# Patient Record
Sex: FEMALE | Race: WHITE | NOT HISPANIC OR LATINO | Employment: PART TIME | URBAN - METROPOLITAN AREA
[De-identification: names, ages, dates, MRNs, and addresses within clinical notes are randomized per-mention and may not be internally consistent; named-entity substitution may affect disease eponyms.]

---

## 2017-08-14 ENCOUNTER — ALLSCRIPTS OFFICE VISIT (OUTPATIENT)
Dept: OTHER | Facility: OTHER | Age: 22
End: 2017-08-14

## 2017-08-14 LAB — HCG, QUALITATIVE (HISTORICAL): NEGATIVE

## 2017-09-14 ENCOUNTER — GENERIC CONVERSION - ENCOUNTER (OUTPATIENT)
Dept: OTHER | Facility: OTHER | Age: 22
End: 2017-09-14

## 2018-01-13 VITALS
DIASTOLIC BLOOD PRESSURE: 56 MMHG | HEART RATE: 80 BPM | SYSTOLIC BLOOD PRESSURE: 104 MMHG | HEIGHT: 63 IN | RESPIRATION RATE: 12 BRPM | BODY MASS INDEX: 25.69 KG/M2 | TEMPERATURE: 97.2 F | WEIGHT: 145 LBS

## 2018-01-14 NOTE — PROGRESS NOTES
Assessment    1  BMI 26 0-26 9,adult (V85 22) (Z68 26)   2  Encounter for preventive health examination (V70 0) (Z00 00)   3  Birth control counseling (V25 09) (Z30 09)   4  Need for diphtheria-tetanus-pertussis (Tdap) vaccine (V06 1) (Z23)    Plan  Birth control counseling    · Sprintec 28 0 25-35 MG-MCG Oral Tablet; TAKE 1 TABLET DAILY AS DIRECTED   · Urine HCG- POC; Status:Complete;   Done: [id number] 01:48PM  Need for diphtheria-tetanus-pertussis (Tdap) vaccine    · Tdap (Adacel)    Discussion/Summary  health maintenance visit Currently, she eats a healthy diet and has an adequate exercise regimen  the risks and benefits of cervical cancer screening were discussed The risks and benefits of immunizations were discussed  Advice and education were given regarding nutrition, aerobic exercise, reproductive health and contraception  Pt will schedule pap  will start on ocp  preg in office     recommend Sunday start date for OCP  continue eat healthy and exercise  The patient was counseled regarding instructions for management, risk factor reductions, patient and family education, impressions, risks and benefits of treatment options, importance of compliance with treatment  Possible side effects of new medications were reviewed with the patient/guardian today  The treatment plan was reviewed with the patient/guardian  The patient/guardian understands and agrees with the treatment plan      Chief Complaint  Patient states that her last PCP was pediatrician  Patient adds that she is considering starting birth control  bh/lpn      History of Present Illness  HM, Adult Female: The patient is being seen for a health maintenance evaluation  Social History: Household members include mother and father  Work status:   The patient has never smoked cigarettes  She reports occasional alcohol use  General Health: The patient's health since the last visit is described as good  She has regular dental visits   She denies vision problems  She denies hearing loss  Immunizations status: not up to date  Lifestyle:  She consumes a diverse and healthy diet  She does not have any weight concerns  She exercises regularly  She does not use tobacco  She consumes alcohol  She denies drug use  Reproductive health:  she is sexually active  started to be sexually active recently---will schedule pap  wants to start the pill  has 3year old daughter  Screening: cancer screening reviewed and updated  metabolic screening reviewed and updated  risk screening reviewed and updated  HPI: Pt seen and examined  Here as a new patient  UTD with all vaccines except due for adacel  Review of Systems    Constitutional: not feeling poorly, no recent weight gain, not feeling tired and no recent weight loss  Eyes: no eyesight problems  ENT: no hearing loss  Cardiovascular: the heart rate was not slow, no chest pain, the heart rate was not fast and no palpitations  Respiratory: no shortness of breath, no cough and no shortness of breath during exertion  Gastrointestinal: no change in bowel or bladder habits  Genitourinary: no change in bowel or bladder habits  Musculoskeletal: no arthralgias and no myalgias  Integumentary: no rashes  Neurological: no headache and no dizziness  Psychiatric: no anxiety and no depression  Surgical History    · History of Ear Pressure Equalization Tube, Insertion, Bilaterally    Social History    · Caffeine use (V49 89) (F15 90)   · Consumes alcohol occasionally (V49 89) (Z78 9)   · Does not use illicit drugs (W76 13) (Z78 9)   · Never     Current Meds   1  No Reported Medications Recorded    Allergies    1   No Known Drug Allergies    Vitals   Recorded: [id number] 01:02PM   Temperature 97 2 F   Heart Rate 80   Respiration 12   Systolic 051   Diastolic 56   Height 5 ft 2 5 in   Weight 145 lb    BMI Calculated 26 1   BSA Calculated 1 68     Physical Exam    Constitutional   General appearance: No acute distress, well appearing and well nourished  Head and Face   Head and face: Normal     Eyes   Conjunctiva and lids: No swelling, erythema or discharge  Pupils and irises: Equal, round, reactive to light  Ears, Nose, Mouth, and Throat   External inspection of ears and nose: Normal     Otoscopic examination: Tympanic membranes translucent with normal light reflex  Canals patent without erythema  Lips, teeth, and gums: Normal, good dentition  Oropharynx: Normal with no erythema, edema, exudate or lesions  Neck   Neck: Supple, symmetric, trachea midline, no masses  Thyroid: Normal, no thyromegaly  Pulmonary   Respiratory effort: No increased work of breathing or signs of respiratory distress  Auscultation of lungs: Clear to auscultation  Cardiovascular   Auscultation of heart: Normal rate and rhythm, normal S1 and S2, no murmurs  Peripheral vascular exam: Normal     Examination of extremities for edema and/or varicosities: Normal     Abdomen   Abdomen: Non-tender, no masses  Liver and spleen: No hepatomegaly or splenomegaly  Lymphatic   Palpation of lymph nodes in neck: No lymphadenopathy  Musculoskeletal   Gait and station: Normal     Digits and nails: Normal without clubbing or cyanosis  Joints, bones, and muscles: Normal     Muscle strength/tone: Normal     Neurologic   Cranial nerves: Cranial nerves II-XII intact  Cortical function: Normal mental status  Reflexes: 2+ and symmetric      Psychiatric   Judgment and insight: Normal     Mood and affect: Normal        Results/Data  Urine HCG- POC [id number] 01:48PM Akosua Vargas     Test Name Result Flag Reference   Urine HCG Negative         Future Appointments    Date/Time Provider Specialty Site   09/05/2017 01:00 PM Akosua Vargas DO Family Medicine [facility]     Signatures   Electronically signed by : Ally Duke DO; Aug 15 2017  9:09AM EST (Author)

## 2018-01-22 VITALS
RESPIRATION RATE: 16 BRPM | HEART RATE: 74 BPM | BODY MASS INDEX: 25.94 KG/M2 | TEMPERATURE: 97.6 F | SYSTOLIC BLOOD PRESSURE: 110 MMHG | WEIGHT: 146.4 LBS | DIASTOLIC BLOOD PRESSURE: 74 MMHG | HEIGHT: 63 IN

## 2018-02-22 ENCOUNTER — HOSPITAL ENCOUNTER (INPATIENT)
Facility: HOSPITAL | Age: 23
LOS: 1 days | Discharge: HOME | DRG: 872 | End: 2018-02-23
Attending: EMERGENCY MEDICINE | Admitting: STUDENT IN AN ORGANIZED HEALTH CARE EDUCATION/TRAINING PROGRAM
Payer: COMMERCIAL

## 2018-02-22 ENCOUNTER — APPOINTMENT (EMERGENCY)
Dept: RADIOLOGY | Facility: HOSPITAL | Age: 23
DRG: 872 | End: 2018-02-22
Payer: COMMERCIAL

## 2018-02-22 DIAGNOSIS — J11.1 INFLUENZA: ICD-10-CM

## 2018-02-22 DIAGNOSIS — N39.0 UTI (URINARY TRACT INFECTION): ICD-10-CM

## 2018-02-22 DIAGNOSIS — R50.9 FEBRILE ILLNESS: Primary | ICD-10-CM

## 2018-02-22 PROBLEM — D72.829 LEUKOCYTOSIS: Status: ACTIVE | Noted: 2018-02-22

## 2018-02-22 PROBLEM — J06.9 URI WITH COUGH AND CONGESTION: Status: ACTIVE | Noted: 2018-02-22

## 2018-02-22 PROBLEM — E87.6 HYPOKALEMIA: Status: ACTIVE | Noted: 2018-02-22

## 2018-02-22 PROBLEM — A41.9 SEPSIS (HCC): Status: ACTIVE | Noted: 2018-02-22

## 2018-02-22 LAB
ALBUMIN SERPL BCP-MCNC: 3.6 G/DL (ref 3.5–5)
ALP SERPL-CCNC: 63 U/L (ref 46–116)
ALT SERPL W P-5'-P-CCNC: 19 U/L (ref 12–78)
ANION GAP SERPL CALCULATED.3IONS-SCNC: 14 MMOL/L (ref 4–13)
AST SERPL W P-5'-P-CCNC: 12 U/L (ref 5–45)
BACTERIA UR QL AUTO: ABNORMAL /HPF
BILIRUB DIRECT SERPL-MCNC: 0.2 MG/DL (ref 0–0.2)
BILIRUB SERPL-MCNC: 0.7 MG/DL (ref 0.2–1)
BILIRUB UR QL STRIP: ABNORMAL
BUN SERPL-MCNC: 11 MG/DL (ref 5–25)
CALCIUM SERPL-MCNC: 8.7 MG/DL (ref 8.3–10.1)
CHLORIDE SERPL-SCNC: 98 MMOL/L (ref 100–108)
CLARITY UR: ABNORMAL
CO2 SERPL-SCNC: 26 MMOL/L (ref 21–32)
COLOR UR: YELLOW
CREAT SERPL-MCNC: 0.77 MG/DL (ref 0.6–1.3)
ERYTHROCYTE [DISTWIDTH] IN BLOOD BY AUTOMATED COUNT: 13.3 % (ref 11.6–15.1)
GFR SERPL CREATININE-BSD FRML MDRD: 110 ML/MIN/1.73SQ M
GLUCOSE SERPL-MCNC: 128 MG/DL (ref 65–140)
GLUCOSE UR STRIP-MCNC: NEGATIVE MG/DL
HCT VFR BLD AUTO: 42.9 % (ref 37–47)
HGB BLD-MCNC: 14.4 G/DL (ref 12–16)
HGB UR QL STRIP.AUTO: ABNORMAL
KETONES UR STRIP-MCNC: ABNORMAL MG/DL
LACTATE SERPL-SCNC: 0.9 MMOL/L (ref 0.5–2)
LEUKOCYTE ESTERASE UR QL STRIP: NEGATIVE
MAGNESIUM SERPL-MCNC: 1.5 MG/DL (ref 1.6–2.6)
MCH RBC QN AUTO: 27.3 PG (ref 27–31)
MCHC RBC AUTO-ENTMCNC: 33.6 G/DL (ref 31.4–37.4)
MCV RBC AUTO: 81 FL (ref 82–98)
MONOCYTES # BLD AUTO: 0.43 THOUSAND/UL (ref 0–1.22)
MONOCYTES NFR BLD AUTO: 2 % (ref 4–12)
NEUTS BAND NFR BLD MANUAL: 25 % (ref 0–8)
NEUTS SEG # BLD: 20.97 THOUSAND/UL (ref 1.81–6.82)
NEUTS SEG NFR BLD AUTO: 73 %
NITRITE UR QL STRIP: NEGATIVE
NON-SQ EPI CELLS URNS QL MICRO: ABNORMAL /HPF
NRBC BLD AUTO-RTO: 0 /100 WBCS
PH UR STRIP.AUTO: 6 [PH] (ref 5–9)
PLATELET # BLD AUTO: 235 THOUSANDS/UL (ref 130–400)
PLATELET BLD QL SMEAR: ADEQUATE
PMV BLD AUTO: 9.7 FL (ref 8.9–12.7)
POTASSIUM SERPL-SCNC: 3 MMOL/L (ref 3.5–5.3)
PROT SERPL-MCNC: 8.4 G/DL (ref 6.4–8.2)
PROT UR STRIP-MCNC: ABNORMAL MG/DL
RBC # BLD AUTO: 5.28 MILLION/UL (ref 4.2–5.4)
RBC #/AREA URNS AUTO: ABNORMAL /HPF
SODIUM SERPL-SCNC: 138 MMOL/L (ref 136–145)
SP GR UR STRIP.AUTO: >=1.03 (ref 1–1.03)
TOTAL CELLS COUNTED SPEC: 100
UROBILINOGEN UR QL STRIP.AUTO: 0.2 E.U./DL
WBC # BLD AUTO: 21.4 THOUSAND/UL (ref 4.8–10.8)
WBC #/AREA URNS AUTO: ABNORMAL /HPF

## 2018-02-22 PROCEDURE — 87798 DETECT AGENT NOS DNA AMP: CPT | Performed by: EMERGENCY MEDICINE

## 2018-02-22 PROCEDURE — 80076 HEPATIC FUNCTION PANEL: CPT | Performed by: STUDENT IN AN ORGANIZED HEALTH CARE EDUCATION/TRAINING PROGRAM

## 2018-02-22 PROCEDURE — 96361 HYDRATE IV INFUSION ADD-ON: CPT

## 2018-02-22 PROCEDURE — 87081 CULTURE SCREEN ONLY: CPT | Performed by: STUDENT IN AN ORGANIZED HEALTH CARE EDUCATION/TRAINING PROGRAM

## 2018-02-22 PROCEDURE — 96374 THER/PROPH/DIAG INJ IV PUSH: CPT

## 2018-02-22 PROCEDURE — 81001 URINALYSIS AUTO W/SCOPE: CPT | Performed by: EMERGENCY MEDICINE

## 2018-02-22 PROCEDURE — 80048 BASIC METABOLIC PNL TOTAL CA: CPT | Performed by: EMERGENCY MEDICINE

## 2018-02-22 PROCEDURE — 85007 BL SMEAR W/DIFF WBC COUNT: CPT | Performed by: EMERGENCY MEDICINE

## 2018-02-22 PROCEDURE — 83735 ASSAY OF MAGNESIUM: CPT | Performed by: STUDENT IN AN ORGANIZED HEALTH CARE EDUCATION/TRAINING PROGRAM

## 2018-02-22 PROCEDURE — 87086 URINE CULTURE/COLONY COUNT: CPT | Performed by: EMERGENCY MEDICINE

## 2018-02-22 PROCEDURE — 87040 BLOOD CULTURE FOR BACTERIA: CPT | Performed by: EMERGENCY MEDICINE

## 2018-02-22 PROCEDURE — 96375 TX/PRO/DX INJ NEW DRUG ADDON: CPT

## 2018-02-22 PROCEDURE — 99222 1ST HOSP IP/OBS MODERATE 55: CPT | Performed by: STUDENT IN AN ORGANIZED HEALTH CARE EDUCATION/TRAINING PROGRAM

## 2018-02-22 PROCEDURE — 83605 ASSAY OF LACTIC ACID: CPT | Performed by: EMERGENCY MEDICINE

## 2018-02-22 PROCEDURE — 71046 X-RAY EXAM CHEST 2 VIEWS: CPT

## 2018-02-22 PROCEDURE — 99284 EMERGENCY DEPT VISIT MOD MDM: CPT

## 2018-02-22 PROCEDURE — 36415 COLL VENOUS BLD VENIPUNCTURE: CPT | Performed by: EMERGENCY MEDICINE

## 2018-02-22 PROCEDURE — 85027 COMPLETE CBC AUTOMATED: CPT | Performed by: EMERGENCY MEDICINE

## 2018-02-22 RX ORDER — POTASSIUM CHLORIDE 20 MEQ/1
40 TABLET, EXTENDED RELEASE ORAL ONCE
Status: COMPLETED | OUTPATIENT
Start: 2018-02-22 | End: 2018-02-22

## 2018-02-22 RX ORDER — CIPROFLOXACIN 2 MG/ML
400 INJECTION, SOLUTION INTRAVENOUS EVERY 12 HOURS
Status: DISCONTINUED | OUTPATIENT
Start: 2018-02-22 | End: 2018-02-23 | Stop reason: HOSPADM

## 2018-02-22 RX ORDER — SODIUM CHLORIDE 9 MG/ML
75 INJECTION, SOLUTION INTRAVENOUS CONTINUOUS
Status: DISCONTINUED | OUTPATIENT
Start: 2018-02-22 | End: 2018-02-23 | Stop reason: HOSPADM

## 2018-02-22 RX ORDER — ACETAMINOPHEN 325 MG/1
650 TABLET ORAL ONCE
Status: COMPLETED | OUTPATIENT
Start: 2018-02-22 | End: 2018-02-22

## 2018-02-22 RX ORDER — OSELTAMIVIR PHOSPHATE 75 MG/1
75 CAPSULE ORAL ONCE
Status: COMPLETED | OUTPATIENT
Start: 2018-02-22 | End: 2018-02-22

## 2018-02-22 RX ORDER — MAGNESIUM SULFATE HEPTAHYDRATE 40 MG/ML
2 INJECTION, SOLUTION INTRAVENOUS ONCE
Status: COMPLETED | OUTPATIENT
Start: 2018-02-22 | End: 2018-02-22

## 2018-02-22 RX ORDER — ONDANSETRON 2 MG/ML
4 INJECTION INTRAMUSCULAR; INTRAVENOUS ONCE
Status: COMPLETED | OUTPATIENT
Start: 2018-02-22 | End: 2018-02-22

## 2018-02-22 RX ORDER — HEPARIN SODIUM 5000 [USP'U]/ML
5000 INJECTION, SOLUTION INTRAVENOUS; SUBCUTANEOUS EVERY 8 HOURS SCHEDULED
Status: DISCONTINUED | OUTPATIENT
Start: 2018-02-22 | End: 2018-02-23 | Stop reason: HOSPADM

## 2018-02-22 RX ORDER — KETOROLAC TROMETHAMINE 30 MG/ML
30 INJECTION, SOLUTION INTRAMUSCULAR; INTRAVENOUS ONCE
Status: COMPLETED | OUTPATIENT
Start: 2018-02-22 | End: 2018-02-22

## 2018-02-22 RX ADMIN — POTASSIUM CHLORIDE 40 MEQ: 1500 TABLET, EXTENDED RELEASE ORAL at 18:04

## 2018-02-22 RX ADMIN — ACETAMINOPHEN 650 MG: 325 TABLET, FILM COATED ORAL at 16:08

## 2018-02-22 RX ADMIN — SODIUM CHLORIDE 1000 ML: 0.9 INJECTION, SOLUTION INTRAVENOUS at 17:29

## 2018-02-22 RX ADMIN — SODIUM CHLORIDE 1000 ML: 0.9 INJECTION, SOLUTION INTRAVENOUS at 19:09

## 2018-02-22 RX ADMIN — KETOROLAC TROMETHAMINE 30 MG: 30 INJECTION, SOLUTION INTRAMUSCULAR at 17:28

## 2018-02-22 RX ADMIN — HEPARIN SODIUM 5000 UNITS: 5000 INJECTION, SOLUTION INTRAVENOUS; SUBCUTANEOUS at 22:30

## 2018-02-22 RX ADMIN — POTASSIUM CHLORIDE 20 MEQ: 2 INJECTION, SOLUTION, CONCENTRATE INTRAVENOUS at 23:36

## 2018-02-22 RX ADMIN — PIPERACILLIN SODIUM,TAZOBACTAM SODIUM 3.38 G: 3; .375 INJECTION, POWDER, FOR SOLUTION INTRAVENOUS at 19:09

## 2018-02-22 RX ADMIN — OSELTAMIVIR PHOSPHATE 75 MG: 75 CAPSULE ORAL at 19:09

## 2018-02-22 RX ADMIN — MAGNESIUM SULFATE HEPTAHYDRATE 2 G: 40 INJECTION, SOLUTION INTRAVENOUS at 22:21

## 2018-02-22 RX ADMIN — SODIUM CHLORIDE 75 ML/HR: 0.9 INJECTION, SOLUTION INTRAVENOUS at 22:21

## 2018-02-22 RX ADMIN — ONDANSETRON 4 MG: 2 INJECTION INTRAMUSCULAR; INTRAVENOUS at 17:27

## 2018-02-22 NOTE — ED PROVIDER NOTES
History  Chief Complaint   Patient presents with    Flu Symptoms     pt c/o flu like symptoms x 1 week, states vomiting since last night  25 year female with complaints of 103 5 fever with nausea and vomiting body aches myalgias  Thinks she might have the flu  No other complaints states that this has been going on for approximately 1 week  Patient is awake and alert functioning well states she just does not feel well overall  Patient does not complain of any headache or neck pain  Patient does state her throat was a little bit sore and she has had a dull cough over the last week  No history of immunocompromise state nonsmoker no other medical problems  History provided by:  Patient   used: No        None       History reviewed  No pertinent past medical history  History reviewed  No pertinent surgical history  History reviewed  No pertinent family history  I have reviewed and agree with the history as documented  Social History   Substance Use Topics    Smoking status: Never Smoker    Smokeless tobacco: Never Used    Alcohol use Yes        Review of Systems   Constitutional: Positive for chills and fever  Negative for activity change and diaphoresis  HENT: Negative for congestion, ear pain, nosebleeds, sore throat, trouble swallowing and voice change  Eyes: Negative for pain, discharge and redness  Respiratory: Negative for apnea, cough, choking, shortness of breath, wheezing and stridor  Cardiovascular: Negative for chest pain and palpitations  Gastrointestinal: Negative for abdominal distention, abdominal pain, constipation, diarrhea, nausea and vomiting  Endocrine: Negative for polydipsia  Genitourinary: Negative for difficulty urinating, dysuria, flank pain, frequency, hematuria and urgency  Musculoskeletal: Positive for myalgias  Negative for back pain, gait problem, joint swelling, neck pain and neck stiffness     Skin: Negative for pallor and rash  Neurological: Negative for dizziness, tremors, syncope, speech difficulty, weakness, numbness and headaches  Hematological: Negative for adenopathy  Psychiatric/Behavioral: Negative for confusion, hallucinations, self-injury and suicidal ideas  The patient is not nervous/anxious  Physical Exam  ED Triage Vitals [02/22/18 1600]   Temperature Pulse Respirations Blood Pressure SpO2   (!) 103 5 °F (39 7 °C) (!) 135 20 103/55 98 %      Temp Source Heart Rate Source Patient Position - Orthostatic VS BP Location FiO2 (%)   Tympanic Monitor Sitting Right arm --      Pain Score       No Pain           Orthostatic Vital Signs  Vitals:    02/22/18 1600 02/22/18 1832   BP: 103/55 102/58   Pulse: (!) 135 (!) 108   Patient Position - Orthostatic VS: Sitting        Physical Exam   Constitutional: She is oriented to person, place, and time  Vital signs are normal  She appears well-developed and well-nourished  HENT:   Head: Normocephalic and atraumatic  Right Ear: External ear normal    Left Ear: External ear normal    Nose: Nose normal    Mouth/Throat: Oropharynx is clear and moist    Eyes: Conjunctivae and EOM are normal  Pupils are equal, round, and reactive to light  Neck: Normal range of motion  Neck supple  Cardiovascular: Normal rate, regular rhythm, normal heart sounds and intact distal pulses  Pulmonary/Chest: Effort normal and breath sounds normal    Abdominal: Soft  Bowel sounds are normal    Musculoskeletal: Normal range of motion  Lymphadenopathy:     She has no cervical adenopathy  Neurological: She is alert and oriented to person, place, and time  Skin: Skin is warm  Psychiatric: She has a normal mood and affect  Nursing note and vitals reviewed        ED Medications  Medications   sodium chloride 0 9 % bolus 1,000 mL (1,000 mL Intravenous New Bag 2/22/18 1909)   piperacillin-tazobactam (ZOSYN) IVPB 3 375 g (3 375 g Intravenous New Bag 2/22/18 1909)   acetaminophen (TYLENOL) tablet 650 mg (650 mg Oral Given 2/22/18 1608)   sodium chloride 0 9 % bolus 1,000 mL (0 mL Intravenous Stopped 2/22/18 1909)   ketorolac (TORADOL) injection 30 mg (30 mg Intravenous Given 2/22/18 1728)   ondansetron (ZOFRAN) injection 4 mg (4 mg Intravenous Given 2/22/18 1727)   potassium chloride (K-DUR,KLOR-CON) CR tablet 40 mEq (40 mEq Oral Given 2/22/18 1804)   oseltamivir (TAMIFLU) capsule 75 mg (75 mg Oral Given 2/22/18 1909)       Diagnostic Studies  Results Reviewed     Procedure Component Value Units Date/Time    Blood culture #2 [[id number]] Collected:  02/22/18 1906    Lab Status:  No result Specimen:  Blood from Hand, Right     Urine culture [[id number]] Collected:  02/22/18 1906    Lab Status:  No result Specimen:  Urine from Urine, Clean Catch     UA w Reflex to Microscopic [[id number]] Collected:  02/22/18 1906    Lab Status:  No result Specimen:  Urine from Urine, Clean Catch     Blood culture #1 [[id number]] Collected:  02/22/18 1855    Lab Status: In process Specimen:  Blood from Arm, Right Updated:  02/22/18 1901    Influenza A/B and RSV by PCR (indicated for patients >2 mo of age) [[id number]] Collected:  02/22/18 1857    Lab Status: In process Specimen:  Nasopharyngeal from Nasopharyngeal Swab Updated:  02/22/18 1901    Lactic acid, plasma [[id number]] Collected:  02/22/18 1855    Lab Status:   In process Specimen:  Blood from Arm, Right Updated:  02/22/18 1900    CBC and differential [[id number]]  (Abnormal) Collected:  02/22/18 1727    Lab Status:  Final result Specimen:  Blood from Arm, Left Updated:  02/22/18 1758     WBC 21 40 (H) Thousand/uL      RBC 5 28 Million/uL      Hemoglobin 14 4 g/dL      Hematocrit 42 9 %      MCV 81 (L) fL      MCH 27 3 pg      MCHC 33 6 g/dL      RDW 13 3 %      MPV 9 7 fL      Platelets 526 Thousands/uL      nRBC 0 /100 WBCs     Basic metabolic panel [[id number]]  (Abnormal) Collected:  02/22/18 1727    Lab Status:  Final result Specimen:  Blood from Arm, Left Updated: 02/22/18 1744     Sodium 138 mmol/L      Potassium 3 0 (L) mmol/L      Chloride 98 (L) mmol/L      CO2 26 mmol/L      Anion Gap 14 (H) mmol/L      BUN 11 mg/dL      Creatinine 0 77 mg/dL      Glucose 128 mg/dL      Calcium 8 7 mg/dL      eGFR 110 ml/min/1 73sq m     Narrative:         National Kidney Disease Education Program recommendations are as follows:  GFR calculation is accurate only with a steady state creatinine  Chronic Kidney disease less than 60 ml/min/1 73 sq  meters  Kidney failure less than 15 ml/min/1 73 sq  meters  XR chest 2 views   Final Result by Noemi Maza MD (02/22 1719)      No acute cardiopulmonary disease  Workstation performed: [id number]                    Procedures  Procedures       Phone Contacts  ED Phone Contact    ED Course  ED Course                          Initial Sepsis Screening     [address] Name 02/22/18 1825                Is the patient's history suggestive of a new or worsening infection? (!)  Yes (Proceed)  -TRACI        Suspected source of infection suspect infection, source unknown  -TRACI        Are two or more of the following signs & symptoms of infection both present and new to the patient? (!)  Yes (Proceed)  -TRACI        Indicate SIRS criteria Hyperthemia > 38 3C (100 9F); Leukocytosis (WBC > 03772 IJL);WBC > 10% bands; Tachycardia > 90 bpm  -TRACI        If the answer is yes to both questions, suspicion of sepsis is present          If severe sepsis is present AND tissue hypoperfusion perists in the hour after fluid resuscitation or lactate > 4, the patient meets criteria for SEPTIC SHOCK          Are any of the following organ dysfunction criteria present within 6 hours of suspected infection and SIRS criteria that are NOT considered to be chronic conditions?  (!)  Yes  -TRACI        Organ dysfunction          Date of presentation of severe sepsis 02/22/18  -TRACI        Time of presentation of severe sepsis 1826  -TRACI        Tissue hypoperfusion persists in the hour after crystalloid fluid administration, evidenced, by either:          Was hypotension present within one hour of the conclusion of crystalloid fluid administration?         Date of presentation of septic shock          Time of presentation of septic shock            User Key  (r) = Recorded By, (t) = Taken By, (c) = Cosigned By    Initials Name Provider Type    Brandon Gonzalez DO Physician                  MDM  CritCare Time    Disposition  Final diagnoses:   Febrile illness     Time reflects when diagnosis was documented in both MDM as applicable and the Disposition within this note     Time User Action Codes Description Comment    2/22/2018  7:11 PM Spencer Masters Add [R50 9] Febrile illness       ED Disposition     ED Disposition Condition Comment    Admit  Case was discussed with dr Juan Tolentino and the patient's admission status was agreed to be Admission Status: inpatient status to the service of Dr Juan Tolentino   Follow-up Information    None       Patient's Medications    No medications on file     No discharge procedures on file      ED Provider  Electronically Signed by           Jada Meza DO  02/22/18 6979

## 2018-02-23 VITALS
DIASTOLIC BLOOD PRESSURE: 53 MMHG | RESPIRATION RATE: 20 BRPM | WEIGHT: 137.79 LBS | TEMPERATURE: 99.8 F | HEART RATE: 95 BPM | BODY MASS INDEX: 24.41 KG/M2 | OXYGEN SATURATION: 100 % | SYSTOLIC BLOOD PRESSURE: 111 MMHG | HEIGHT: 63 IN

## 2018-02-23 PROBLEM — E87.6 HYPOKALEMIA: Status: RESOLVED | Noted: 2018-02-22 | Resolved: 2018-02-23

## 2018-02-23 PROBLEM — A41.9 SEPSIS (HCC): Status: RESOLVED | Noted: 2018-02-22 | Resolved: 2018-02-23

## 2018-02-23 PROBLEM — D72.829 LEUKOCYTOSIS: Status: RESOLVED | Noted: 2018-02-22 | Resolved: 2018-02-23

## 2018-02-23 PROBLEM — J11.1 INFLUENZA: Status: ACTIVE | Noted: 2018-02-22

## 2018-02-23 LAB
ANION GAP SERPL CALCULATED.3IONS-SCNC: 9 MMOL/L (ref 4–13)
BACTERIA UR CULT: NORMAL
BUN SERPL-MCNC: 11 MG/DL (ref 5–25)
CALCIUM SERPL-MCNC: 7.7 MG/DL (ref 8.3–10.1)
CHLORIDE SERPL-SCNC: 107 MMOL/L (ref 100–108)
CO2 SERPL-SCNC: 26 MMOL/L (ref 21–32)
CREAT SERPL-MCNC: 0.54 MG/DL (ref 0.6–1.3)
ERYTHROCYTE [DISTWIDTH] IN BLOOD BY AUTOMATED COUNT: 13.4 % (ref 11.6–15.1)
FLUAV AG SPEC QL: ABNORMAL
FLUBV AG SPEC QL: DETECTED
GFR SERPL CREATININE-BSD FRML MDRD: 134 ML/MIN/1.73SQ M
GLUCOSE SERPL-MCNC: 89 MG/DL (ref 65–140)
HCT VFR BLD AUTO: 36 % (ref 37–47)
HGB BLD-MCNC: 12.1 G/DL (ref 12–16)
MAGNESIUM SERPL-MCNC: 2.6 MG/DL (ref 1.6–2.6)
MCH RBC QN AUTO: 27.5 PG (ref 27–31)
MCHC RBC AUTO-ENTMCNC: 33.6 G/DL (ref 31.4–37.4)
MCV RBC AUTO: 82 FL (ref 82–98)
PLATELET # BLD AUTO: 165 THOUSANDS/UL (ref 130–400)
PMV BLD AUTO: 9.7 FL (ref 8.9–12.7)
POTASSIUM SERPL-SCNC: 3.7 MMOL/L (ref 3.5–5.3)
RBC # BLD AUTO: 4.39 MILLION/UL (ref 4.2–5.4)
RSV B RNA SPEC QL NAA+PROBE: ABNORMAL
SODIUM SERPL-SCNC: 142 MMOL/L (ref 136–145)
WBC # BLD AUTO: 10.2 THOUSAND/UL (ref 4.8–10.8)

## 2018-02-23 PROCEDURE — 99239 HOSP IP/OBS DSCHRG MGMT >30: CPT | Performed by: STUDENT IN AN ORGANIZED HEALTH CARE EDUCATION/TRAINING PROGRAM

## 2018-02-23 PROCEDURE — 85027 COMPLETE CBC AUTOMATED: CPT | Performed by: STUDENT IN AN ORGANIZED HEALTH CARE EDUCATION/TRAINING PROGRAM

## 2018-02-23 PROCEDURE — 83735 ASSAY OF MAGNESIUM: CPT | Performed by: STUDENT IN AN ORGANIZED HEALTH CARE EDUCATION/TRAINING PROGRAM

## 2018-02-23 PROCEDURE — 80048 BASIC METABOLIC PNL TOTAL CA: CPT | Performed by: STUDENT IN AN ORGANIZED HEALTH CARE EDUCATION/TRAINING PROGRAM

## 2018-02-23 RX ORDER — CIPROFLOXACIN 500 MG/1
500 TABLET, FILM COATED ORAL EVERY 12 HOURS SCHEDULED
Qty: 4 TABLET | Refills: 0 | Status: SHIPPED | OUTPATIENT
Start: 2018-02-23 | End: 2018-02-25

## 2018-02-23 RX ORDER — OSELTAMIVIR PHOSPHATE 75 MG/1
75 CAPSULE ORAL EVERY 12 HOURS SCHEDULED
Status: DISCONTINUED | OUTPATIENT
Start: 2018-02-23 | End: 2018-02-23 | Stop reason: HOSPADM

## 2018-02-23 RX ORDER — ACETAMINOPHEN 325 MG/1
650 TABLET ORAL EVERY 6 HOURS PRN
Status: DISCONTINUED | OUTPATIENT
Start: 2018-02-23 | End: 2018-02-23 | Stop reason: HOSPADM

## 2018-02-23 RX ORDER — OSELTAMIVIR PHOSPHATE 75 MG/1
75 CAPSULE ORAL EVERY 12 HOURS SCHEDULED
Qty: 8 CAPSULE | Refills: 0 | Status: SHIPPED | OUTPATIENT
Start: 2018-02-23 | End: 2018-02-27

## 2018-02-23 RX ORDER — ACETAMINOPHEN 325 MG/1
650 TABLET ORAL EVERY 6 HOURS PRN
Qty: 30 TABLET | Refills: 0 | Status: SHIPPED | OUTPATIENT
Start: 2018-02-23 | End: 2019-04-22 | Stop reason: ALTCHOICE

## 2018-02-23 RX ADMIN — HEPARIN SODIUM 5000 UNITS: 5000 INJECTION, SOLUTION INTRAVENOUS; SUBCUTANEOUS at 06:10

## 2018-02-23 RX ADMIN — CIPROFLOXACIN 400 MG: 2 INJECTION, SOLUTION INTRAVENOUS at 02:14

## 2018-02-23 RX ADMIN — OSELTAMIVIR PHOSPHATE 75 MG: 75 CAPSULE ORAL at 10:33

## 2018-02-23 RX ADMIN — ACETAMINOPHEN 650 MG: 325 TABLET, FILM COATED ORAL at 10:33

## 2018-02-23 RX ADMIN — CIPROFLOXACIN 400 MG: 2 INJECTION, SOLUTION INTRAVENOUS at 10:31

## 2018-02-23 NOTE — DISCHARGE SUMMARY
Discharge- Slime Graham 1995, 25 y o  female MRN: [id number]    Unit/Bed#: [facility]02 Encounter: [id number]    Primary Care Provider: Daniel Andrade DO   Date and time admitted to hospital: 2/22/2018  4:24 PM        Influenza   Assessment & Plan    -patient had the classic symptoms of influenza  -PCR testing was positive for influenza A  -she was treated with Tamiflu and improved rapidly over 24 hours  -she was discharged home with a course of Tamiflu and instructions to increase fluid intake, take tylenol PRN and rest        Hypokalemiaresolved as of 2/23/2018   Assessment & Plan    - likely secondary to her vomiting  Replacement ordered  Leukocytosisresolved as of 2/23/2018   Assessment & Plan    Resolved with supportive care and infection treatment        * Sepsis (HCC)resolved as of 2/23/2018   Assessment & Plan    POA - as evidenced by fever, tachycardia and leukocytosis  Resolved  CXR is negative for an acute process  Influenza was POSITIVE   U/A does show innumerable bacteria  She was given a dose of Zosyn and Tamiflu in the ED  Blood cx were negative preliminary on discharge            Discharging Physician / Practitioner: Karen Jaime MD  PCP: Daniel Andrade DO  Admission Date: 2/22/2018  Discharge Date: 02/23/18    Reason for Admission: Flu Symptoms (pt c/o flu like symptoms x 1 week, states vomiting since last night )        Resolved Problems  Date Reviewed: 2/23/2018          Resolved    * (Principal)Sepsis ([facility] [address] ) 2/23/2018     Resolved by  Karen Jaime MD    Leukocytosis 2/23/2018     Resolved by  Karen Jaime MD    Hypokalemia 2/23/2018     Resolved by  Karen Jaime MD          Consultations During Hospital Stay:  None    Procedures Performed:     · None    Significant Findings / Test Results:     · Influenza B PCR positive    RSV negative  · U:  Small blood, negative leukocytes or nitrites, innumerable bacteria  · Blood culture x2 pending  · Urine culture pending    Xr Chest 2 Views  Result Date: 2/22/2018  Impression: No acute cardiopulmonary disease  Workstation performed: [id number]         Incidental Findings:   ·      Test Results Pending at Discharge (will require follow up): · Blood in urine cultures     Outpatient Tests Requested:  · None    Complications:  None    Reason for Admission:  Cough fever nausea vomiting    Hospital Course:     Florence Martinez is a 25 y o  female patient with no significant PMH who originally presented to the hospital on 2/22/2018 due to fever, cough, congestion and nausea/vomtiing  She states that one week ago she began to have cough, congestion and sore throat  She reported a cough with clear productive sputum  Yesterday she states that she developed a fever and mild midepigastric pain  She then proceeded to have nausea and several episodes of vomiting which has since resolved  Today she decided to come to the ER because she of her fever  Currently she states that her cough, congestion, sore throat and nausea have markedly improved  She denies any chest pain, shortness of breath, abdominal pain, diarrhea, dysuria, flank pain or melena  Of note, she states that she has a cat who scratches her often but she denies any bites  No other complaints or concerns  Please see above list of diagnoses and related plan for additional information  Condition at Discharge: good     Discharge Day Visit / Exam:     Subjective:  Carlos Lovelace feels much better today  She still tired but no nausea or vomiting  She ate well  Her cough is minimal   She wants to go home and rest there    Vitals: Blood Pressure: 111/53 (02/23/18 1410)  Pulse: 95 (02/23/18 1410)  Temperature: 99 8 °F (37 7 °C) (02/23/18 1410)  Temp Source: Oral (02/23/18 1410)  Respirations: 20 (02/23/18 1410)  Height: 5' 3" (160 cm) (02/22/18 2028)  Weight - Scale: 62 5 kg (137 lb 12 6 oz) (02/23/18 0238)  SpO2: 100 % (02/23/18 1410)  Exam:   Physical Exam   Constitutional: She is oriented to person, place, and time  She appears well-developed  No distress  HENT:   Head: Normocephalic and atraumatic  Cardiovascular: Normal rate, regular rhythm and normal heart sounds  Pulmonary/Chest: Effort normal and breath sounds normal  No respiratory distress  She has no wheezes  She has no rales  Abdominal: Soft  Bowel sounds are normal  She exhibits no distension  There is no tenderness  There is no rebound and no guarding  Musculoskeletal: She exhibits no edema, tenderness or deformity  Neurological: She is alert and oriented to person, place, and time  Skin: Skin is warm and dry  Psychiatric: She has a normal mood and affect  Her behavior is normal    Nursing note and vitals reviewed  Discharge instructions/Information to patient and family:   See after visit summary for information provided to patient and family  Provisions for Follow-Up Care:  See after visit summary for information related to follow-up care and any pertinent home health orders  Disposition:     Home    Planned Readmission:  No     Discharge Statement:  I spent over 30 minutes discharging the patient  This time was spent on the day of discharge  I had direct contact with the patient on the day of discharge  Greater than 50% of the total time was spent examining patient, answering all patient questions, arranging and discussing plan of care with patient as well as directly providing post-discharge instructions  Additional time then spent on discharge activities  Discharge Medications:  See after visit summary for reconciled discharge medications provided to patient and family        ** Please Note: This note has been constructed using a voice recognition system **

## 2018-02-23 NOTE — PLAN OF CARE
Problem: DISCHARGE PLANNING - CARE MANAGEMENT  Goal: Discharge to post-acute care or home with appropriate resources  INTERVENTIONS:  - Conduct assessment to determine patient/family and health care team treatment goals, and need for post-acute services based on payer coverage, community resources, and patient preferences, and barriers to discharge  - Address psychosocial, clinical, and financial barriers to discharge as identified in assessment in conjunction with the patient/family and health care team  - Arrange appropriate level of post-acute services according to patient's   needs and preference and payer coverage in collaboration with the physician and health care team  - Communicate with and update the patient/family, physician, and health care team regarding progress on the discharge plan  - Arrange appropriate transportation to post-acute venues  RETURN HOME INDEPENDENTLY  Outcome: Progressing

## 2018-02-23 NOTE — ASSESSMENT & PLAN NOTE
-patient had the classic symptoms of influenza  -PCR testing was positive for influenza A  -she was treated with Tamiflu and improved rapidly over 24 hours  -she was discharged home with a course of Tamiflu and instructions to increase fluid intake, take tylenol PRN and rest

## 2018-02-23 NOTE — SOCIAL WORK
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

## 2018-02-23 NOTE — NURSING NOTE
IV access discontinued  Discharge instructions discussed with the patient, all questions answered  Patient left 3N stable, all personal belongings at side

## 2018-02-23 NOTE — H&P
History and Physical - [address] [place] [address] Internal Medicine    Patient Information: Karen Calixto 25 y o  female MRN: [id number]  Unit/Bed#: [address] [place] [address] Encounter: [id number]  Admitting Physician: Gogo Castro MD  PCP: Iraj Bishop DO  Date of Admission:  02/22/18    Chief Complaint:     Cough, congest, fever, nausea/vomiting   of Present Illness:    Karen Calixto is a 25 y o  female with no significant PMH who presents with fever, cough, congestion and nausea/vomtiing  She states that one week ago she began to have cough, congestion and sore throat  She reported a cough with clear productive sputum  Yesterday she states that she developed a fever and mild midepigastric pain  She then proceeded to have nausea and several episodes of vomiting which has since resolved  Today she decided to come to the ER because she of her fever  Currently she states that her cough, congestion, sore throat and nausea have markedly improved  She denies any chest pain, shortness of breath, abdominal pain, diarrhea, dysuria, flank pain or melena  Of note, she states that she has a cat who scratches her often but she denies any bites  No other complaints or concerns  Review of Systems:    Review of Systems   Constitutional: Positive for fever  HENT: Positive for congestion and sore throat  Respiratory: Positive for cough  Negative for shortness of breath  Cardiovascular: Negative for chest pain and leg swelling  Gastrointestinal: Positive for abdominal pain, nausea and vomiting  Negative for blood in stool  Genitourinary: Negative for dysuria and hematuria  Musculoskeletal: Negative for arthralgias  Neurological: Negative for syncope  Psychiatric/Behavioral: Negative for confusion  Past Medical and Surgical History:     History reviewed  No pertinent past medical history  History reviewed  No pertinent surgical history      Meds/Allergies:    PTA meds:   None       Allergies: No Known Allergies  History:     Marital Status: Single   History   Alcohol Use    Yes     History   Smoking Status    Never Smoker   Smokeless Tobacco    Never Used     History   Drug Use No       Family History: Mother: healthy   Father: healthy     Physical Exam:     Vitals:   Blood Pressure: 108/59 (02/22/18 2028)  Pulse: 98 (02/22/18 2028)  Temperature: 99 3 °F (37 4 °C) (02/22/18 2028)  Temp Source: Tympanic (02/22/18 2028)  Respirations: 19 (02/22/18 2028)  Height: 5' 3" (160 cm) (02/22/18 2028)  Weight - Scale: 62 6 kg (138 lb 0 4 oz) (02/22/18 2028)  SpO2: 99 % (02/22/18 2028)    Physical Exam:   General: in no acute distress  HEENT: atraumatic, normocephalic  Skin: multiple excoriations on dorsum of hands  CVS: RRR, no murmurs appreciated  Lungs: CTAL, no wheezing or rales appreciated  Abdomen: soft, nondistended, bowel sounds normal, nontender upon palpation, no guarding or rebound tenderness, no CVA tenderness bilaterally   Extremities: no edema, no calf swelling or tenderness  Neuro: alert and oriented x3  Psych: calm, cooperative      Lab Results: I have personally reviewed pertinent reports  Results from last 7 days  Lab Units 02/22/18  1727   WBC Thousand/uL 21 40*   HEMOGLOBIN g/dL 14 4   HEMATOCRIT % 42 9   PLATELETS Thousands/uL 235   MONO PCT MAN % 2*       Results from last 7 days  Lab Units 02/22/18  1727   SODIUM mmol/L 138   POTASSIUM mmol/L 3 0*   CHLORIDE mmol/L 98*   CO2 mmol/L 26   BUN mg/dL 11   CREATININE mg/dL 0 77   CALCIUM mg/dL 8 7   TOTAL PROTEIN g/dL 8 4*   BILIRUBIN TOTAL mg/dL 0 70   ALK PHOS U/L 63   ALT U/L 19   AST U/L 12   GLUCOSE RANDOM mg/dL 128           Imaging:     Xr Chest 2 Views    Result Date: 2/22/2018  Narrative: CHEST INDICATION: cough/fever COMPARISON:  None EXAM PERFORMED/VIEWS:  XR CHEST PA & LATERAL IMAGES:  2 FINDINGS: Cardiomediastinal silhouette appears unremarkable  The lungs are clear  No pneumothorax or pleural effusion   Visualized osseous structures appear within normal limits for the patient's age  Impression: No acute cardiopulmonary disease  Workstation performed: [id number]         Assessment/Plan    Sepsis [facility])   Assessment & Plan    POA - as evidenced by fever, tachycardia and leukocytosis  CXR is negative for an acute process  Influenza pending  No clear source identified as of yet though her U/A does show innumerable bacteria and some symptoms of gastroenteritis (mild abdominal pain, nausea/vomiting) which have now resolved  She was given a dose of Zosyn and Tamiflu in the ED  At this time, I wont resume Tamiflu unless she is found to be Influenza positive  She does have some excoriations on her hands which she attributes to her cat  She does not have any tender lymphadenopathy but I will start her on Ciprofloxacin given the probable UTI which should also cover for Cat-Scratch disease  Blood culture pending         URI with cough and congestion   Assessment & Plan    - rapid flu testing ordered as above  She was also given a dose of Tamiflu  Will not resume until results known  Continue symptomatic management         Hypokalemia   Assessment & Plan    - likely secondary to her vomiting  Replacement ordered  Will repeat K tomorrow and order Mag level        Leukocytosis   Assessment & Plan    - will resume plan as above  Repeat CBC tomorrow             Hospital Problem List:     Active Problems:    Sepsis ([address] )    Leukocytosis    Hypokalemia    URI with cough and congestion        VTE Prophylaxis: Heparin   Code Status: Level 1 - Full Code    Anticipated Length of Stay:  Patient will be admitted on an Inpatient basis with an anticipated length of stay of atleast 2 midnights  Total Time for Visit, including Counseling / Coordination of Care: 30 minutes  Greater than 50% of this total time spent on direct patient counseling and coordination of care

## 2018-02-23 NOTE — CASE MANAGEMENT
Initial Clinical Review    Admission: Date/Time/Statement: 2/22/18 @ 1911     Orders Placed This Encounter   Procedures    Inpatient Admission (expected length of stay for this patient is greater than two midnights)     Standing Status:   Standing     Number of Occurrences:   1     Order Specific Question:   Admitting Physician     Answer:   Sidney Merritt [[id number]]     Order Specific Question:   Level of Care     Answer:   Med Surg [[id number]]     Order Specific Question:   Estimated length of stay     Answer:   More than 2 Midnights     Order Specific Question:   Certification     Answer:   I certify that inpatient services are medically necessary for this patient for a duration of greater than two midnights  See H&P and MD Progress Notes for additional information about the patient's course of treatment  ED: Date/Time/Mode of Arrival:   ED Arrival Information     Expected Arrival Acuity Means of Arrival Escorted By Service Admission Type    - 2/22/2018 15:55 Urgent Walk-In Family Member General Medicine Urgent    Arrival Complaint    fly like symtoms          Chief Complaint:   Chief Complaint   Patient presents with    Flu Symptoms     pt c/o flu like symptoms x 1 week, states vomiting since last night  History of Illness: Brenna Gonzalez is a 25 y o  female with no significant PMH who presents with fever, cough, congestion and nausea/vomtiing  She states that one week ago she began to have cough, congestion and sore throat  She reported a cough with clear productive sputum  Yesterday she states that she developed a fever and mild midepigastric pain  She then proceeded to have nausea and several episodes of vomiting which has since resolved  Today she decided to come to the ER because she of her fever  She denies any chest pain, shortness of breath, abdominal pain, diarrhea, dysuria, flank pain or melena  Of note, she states that she has a cat who scratches her often but she denies any bites    No other complaints or concerns    ED Vital Signs:   ED Triage Vitals [02/22/18 1600]   Temperature Pulse Respirations Blood Pressure SpO2   (!) 103 5 °F (39 7 °C) (!) 135 20 103/55 98 %      Temp Source Heart Rate Source Patient Position - Orthostatic VS BP Location FiO2 (%)   Tympanic Monitor Sitting Right arm --      Pain Score       No Pain        Wt Readings from Last 1 Encounters:   02/23/18 62 5 kg (137 lb 12 6 oz)       Vital Signs (abnormal): Abnormal Labs/Diagnostic Test Results: MG 1 5 ANION GAP 14   BANDS 25% INFLU B DETECTED   WBC 21 40 (H) Thousand/uL     MCV 81 (L) fL     Potassium 3 0 (L) mmol/L          Chloride 98 (L) mmol/L   CXR No acute cardiopulmonary disease         ED Treatment:   Medication Administration from 02/22/2018 1555 to 02/22/2018 2028       Date/Time Order Dose Route Action Action by Comments     02/22/2018 1608 acetaminophen (TYLENOL) tablet 650 mg 650 mg Oral Given Irena Meléndez RN      02/22/2018 1909 sodium chloride 0 9 % bolus 1,000 mL 0 mL Intravenous Stopped Gisella Cantu RN      02/22/2018 1729 sodium chloride 0 9 % bolus 1,000 mL 1,000 mL Intravenous [facility], RN      02/22/2018 1728 ketorolac (TORADOL) injection 30 mg 30 mg Intravenous Given [employer], JOSE F      02/22/2018 1727 ondansetron (ZOFRAN) injection 4 mg 4 mg Intravenous Given [employer], RN      02/22/2018 1804 potassium chloride (K-DUR,KLOR-CON) CR tablet 40 mEq 40 mEq Oral Given Spring Barlow RN      02/22/2018 1909 sodium chloride 0 9 % bolus 1,000 mL 1,000 mL Intravenous New Bag Gisella Cantu RN      02/22/2018 1909 piperacillin-tazobactam (ZOSYN) IVPB 3 375 g 3 375 g Intravenous New Bag Gisella Cantu RN      02/22/2018 1909 oseltamivir (TAMIFLU) capsule 75 mg 75 mg Oral Given Gisella Cantu RN           Past Medical/Surgical History:    Active Ambulatory Problems     Diagnosis Date Noted    No Active Ambulatory Problems     Resolved Ambulatory Problems Diagnosis Date Noted    No Resolved Ambulatory Problems     No Additional Past Medical History       Admitting Diagnosis: Febrile illness [R50 9]  Flu-like symptoms [R68 89]    Age/Sex: 25 y o  female    ATTENDING  Assessment/Plan     [facility]eps[facility] ([facility] )   Assessment & Plan     POA - as evidenced by fever, tachycardia and leukocytosis  CXR is negative for an acute process  Influenza pending  No clear source identified as of yet though her U/A does show innumerable bacteria and some symptoms of gastroenteritis (mild abdominal pain, nausea/vomiting) which have now resolved  She was given a dose of Zosyn and Tamiflu in the ED  At this time, I wont resume Tamiflu unless she is found to be Influenza positive  She does have some excoriations on her hands which she attributes to her cat  She does not have any tender lymphadenopathy but I will start her on Ciprofloxacin given the probable UTI which should also cover for Cat-Scratch disease  Blood culture pending       URI with cough and congestion   Assessment & Plan     - rapid flu testing ordered as above  She was also given a dose of Tamiflu  Will not resume until results known  Continue symptomatic management       Hypokalemia   Assessment & Plan     - likely secondary to her vomiting  Replacement ordered  Will repeat K tomorrow and order Mag level      Leukocytosis   Assessment & Plan     - will resume plan as above    Repeat CBC tomorrow         Hospital Problem List:   Active Problems:    Sepsis ([facility] )    Leukocytosis    Hypokalemia    URI with cough and congestion  VTE Prophylaxis: Heparin   Code Status: Level 1 - Full Code  Anticipated Length of Stay:  Patient will be admitted on an Inpatient basis with an anticipated length of stay of atleast 2 midnights        Admission Orders:  GMF  DROPLET ISOLATION  DAILY WEIGHT I/O  Scheduled Meds:   Current Facility-Administered Medications:  ciprofloxacin 400 mg Intravenous Q12H Sharyl Favre, MD Last Rate: 400 mg (02/23/18 0214)   heparin (porcine) 5,000 Units Subcutaneous Q8H Sandra Chaney MD    influenza vaccine 0 5 mL Intramuscular Prior to discharge Katya Parks MD    oseltamivir 75 mg Oral Q12H 3630 Manuel Valiente MD    sodium chloride 75 mL/hr Intravenous Continuous Katya Parks MD Last Rate: 75 mL/hr (02/22/18 2221)     Continuous Infusions:   sodium chloride 75 mL/hr Last Rate: 75 mL/hr (02/22/18 2221)     PRN Meds: influenza vaccine

## 2018-02-23 NOTE — ASSESSMENT & PLAN NOTE
POA - as evidenced by fever, tachycardia and leukocytosis  Resolved  CXR is negative for an acute process  Influenza was POSITIVE   U/A does show innumerable bacteria  She was given a dose of Zosyn and Tamiflu in the ED     Blood cx were negative preliminary on discharge

## 2018-02-23 NOTE — DISCHARGE INSTRUCTIONS
-Get plenty of rest and increase you fluid intake, you can drink Gatorade or other drinks containing electrolytes  Take tylenol for body aches, fever, chills or headache    -hand washing will prevent transmission to other people  Influenza   WHAT YOU NEED TO KNOW:   Influenza (the flu) is an infection caused by the influenza virus  The flu is easily spread when an infected person coughs, sneezes, or has close contact with others  You may be able to spread the flu to others for 1 week or longer after signs or symptoms appear  DISCHARGE INSTRUCTIONS:   Call 911 for any of the following:   · You have trouble breathing, and your lips look purple or blue  · You have a seizure  Seek care immediately if:   · You are dizzy, or you are urinating less or not at all  · You have a headache with a stiff neck, and you feel tired or confused  · You have new pain or pressure in your chest     · Your symptoms, such as shortness of breath, vomiting, or diarrhea, get worse  · Your symptoms, such as fever and coughing, seem to get better, but then get worse  Contact your healthcare provider if:   · You have new muscle pain or weakness  · You have questions or concerns about your condition or care  Medicines: You may need any of the following:  · Acetaminophen  decreases pain and fever  It is available without a doctor's order  Ask how much to take and how often to take it  Follow directions  Acetaminophen can cause liver damage if not taken correctly  · NSAIDs , such as ibuprofen, help decrease swelling, pain, and fever  This medicine is available with or without a doctor's order  NSAIDs can cause stomach bleeding or kidney problems in certain people  If you take blood thinner medicine, always ask your healthcare provider if NSAIDs are safe for you  Always read the medicine label and follow directions  · Antivirals  help fight a viral infection  · Take your medicine as directed    Contact your healthcare provider if you think your medicine is not helping or if you have side effects  Tell him or her if you are allergic to any medicine  Keep a list of the medicines, vitamins, and herbs you take  Include the amounts, and when and why you take them  Bring the list or the pill bottles to follow-up visits  Carry your medicine list with you in case of an emergency  Rest  as much as you can to help you recover  Drink liquids as directed  to help prevent dehydration  Ask how much liquid to drink each day and which liquids are best for you  Prevent the spread of influenza:   · Wash your hands often  Use soap and water  Wash your hands after you use the bathroom, change a child's diapers, or sneeze  Wash your hands before you prepare or eat food  Use gel hand cleanser when soap and water are not available  Do not touch your eyes, nose, or mouth unless you have washed your hands first            · Cover your mouth when you sneeze or cough  Cough into a tissue or the bend of your arm  · Clean shared items with a germ-killing   Clean table surfaces, doorknobs, and light switches  Do not share towels, silverware, and dishes with people who are sick  Wash bed sheets, towels, silverware, and dishes with soap and water  · Wear a mask  over your mouth and nose if you are sick or are near anyone who is sick  · Stay away from others  if you are sick  · Influenza vaccine  helps prevent influenza (flu)  Everyone older than 6 months should get a yearly influenza vaccine  Get the vaccine as soon as it is available, usually in September or October each year  Follow up with your healthcare provider as directed:  Write down your questions so you remember to ask them during your visits  © 2017 [employer] Information is for End User's use only and may not be sold, redistributed or otherwise used for commercial purposes   All illustrations and images included in CareNotes® are the copyrighted property of [employer]  or Eugene Campos  The above information is an  only  It is not intended as medical advice for individual conditions or treatments  Talk to your doctor, nurse or pharmacist before following any medical regimen to see if it is safe and effective for you

## 2018-02-24 LAB — MRSA NOSE QL CULT: NORMAL

## 2018-02-27 LAB
BACTERIA BLD CULT: NORMAL
BACTERIA BLD CULT: NORMAL

## 2018-07-17 DIAGNOSIS — N94.6 DYSMENORRHEA TREATED WITH ORAL CONTRACEPTIVE: Primary | ICD-10-CM

## 2018-07-17 DIAGNOSIS — Z79.3 DYSMENORRHEA TREATED WITH ORAL CONTRACEPTIVE: Primary | ICD-10-CM

## 2018-07-17 RX ORDER — NORGESTIMATE AND ETHINYL ESTRADIOL 0.25-0.035
1 KIT ORAL DAILY
Qty: 28 TABLET | Refills: 0 | Status: SHIPPED | OUTPATIENT
Start: 2018-07-17 | End: 2018-08-07 | Stop reason: SDUPTHER

## 2018-08-07 ENCOUNTER — OFFICE VISIT (OUTPATIENT)
Dept: FAMILY MEDICINE CLINIC | Facility: CLINIC | Age: 23
End: 2018-08-07
Payer: COMMERCIAL

## 2018-08-07 VITALS
TEMPERATURE: 97.5 F | RESPIRATION RATE: 16 BRPM | HEART RATE: 88 BPM | DIASTOLIC BLOOD PRESSURE: 68 MMHG | BODY MASS INDEX: 24.98 KG/M2 | WEIGHT: 141 LBS | SYSTOLIC BLOOD PRESSURE: 102 MMHG | HEIGHT: 63 IN

## 2018-08-07 DIAGNOSIS — Z30.41 ORAL CONTRACEPTIVE PILL SURVEILLANCE: Primary | ICD-10-CM

## 2018-08-07 PROCEDURE — 3008F BODY MASS INDEX DOCD: CPT | Performed by: NURSE PRACTITIONER

## 2018-08-07 PROCEDURE — 1036F TOBACCO NON-USER: CPT | Performed by: NURSE PRACTITIONER

## 2018-08-07 PROCEDURE — 99213 OFFICE O/P EST LOW 20 MIN: CPT | Performed by: NURSE PRACTITIONER

## 2018-08-07 RX ORDER — NORGESTIMATE AND ETHINYL ESTRADIOL 0.25-0.035
1 KIT ORAL DAILY
Qty: 84 TABLET | Refills: 1 | Status: SHIPPED | OUTPATIENT
Start: 2018-08-07 | End: 2019-01-10 | Stop reason: SDUPTHER

## 2018-08-07 RX ORDER — NORGESTIMATE AND ETHINYL ESTRADIOL 0.25-0.035
1 KIT ORAL DAILY
COMMUNITY
Start: 2017-08-14 | End: 2018-08-07 | Stop reason: SDUPTHER

## 2018-08-07 NOTE — PATIENT INSTRUCTIONS
Pap Smear   AMBULATORY CARE:   A Pap smear,  or Pap test, is used to screen for cervical cancer  It is also used to find precancerous and cancerous cells of the vulva and vagina  Prepare for a Pap smear: The best time to schedule the test is right after your period stops  Do not have a Pap smear during your monthly period  During a Pap smear:   · You will lie on your back and place your feet on footrests called stirrups  Your healthcare provider will gently insert a device called a speculum into your vagina  The speculum is used to open the walls of your vagina so he can see your cervix  · Your healthcare provider will gently scrape your cervix and vaginal areas for cell samples  The samples are placed in a container with liquid or on a glass slide  They are sent to a lab and examined for abnormal cells  A test for Human Papillomavirus (HPV) may be done at the same time  HPV is a sexually transmitted virus that can cause changes in cervical cells  After your Pap smear:  Your healthcare provider will tell you when you can expect your Pap smear results  You may have some spotting the day of your procedure  When to get a Pap smear:  Pap smears are usually done every 3 to 5 years depending on your age  You may need a Pap smear more often if you have any of the following:  · Positive test result for the human papillomavirus (HPV)    · A history of cervical cancer    · HIV    · A weak immune system    · Exposure to diethylstilbestrol (FREDDY) medicine when your mother was pregnant with you  © 2017 [address] [employer] Information is for End User's use only and may not be sold, redistributed or otherwise used for commercial purposes  All illustrations and images included in CareNotes® are the copyrighted property of [employer]  or Eugene Campos  The above information is an  only  It is not intended as medical advice for individual conditions or treatments   Talk to your doctor, nurse or pharmacist before following any medical regimen to see if it is safe and effective for you

## 2018-08-07 NOTE — PROGRESS NOTES
Assessment     21 y o , continuing OCP (estrogen/progesterone), no contraindications  Will schedule pap smear, PE    Plan     All questions answered  Breast self exam technique reviewed and patient encouraged to perform self-exam monthly  Contraception: OCP (estrogen/progesterone)  Discussed healthy lifestyle modifications  Educational material distributed  Follow up in 6 months  Subjective      Cindi Pringle is a 21 y o  female who presents for contraception counseling  The patient has no complaints today  The patient is sexually active  Denies past medical history: current smoker, hypertension, liver disease, migraines, pelvic inflammatory disease, sexually transmitted diseases, thromboembolism and urinary tract infections  Last pap within past few years  Normal per patient  Has 10year old daughter  No concerns today  Tolerates OCP well  Menstrual History:  No issues  LMP 3 weeks ago        The following portions of the patient's history were reviewed and updated as appropriate: allergies, current medications, past family history, past medical history, past social history, past surgical history and problem list     Review of Systems  Pertinent items are noted in HPI       Objective      /68 (BP Location: Left arm, Patient Position: Sitting, Cuff Size: Adult)   Pulse 88   Temp 97 5 °F (36 4 °C) (Temporal)   Resp 16   Ht 5' 3" (1 6 m)   Wt 64 kg (141 lb)   BMI 24 98 kg/m²     General:   alert and oriented, in no acute distress   Heart: regular rate and rhythm, S1, S2 normal, no murmur, click, rub or gallop   Lungs: clear to auscultation bilaterally   Abdomen: soft, non-tender, without masses or organomegaly

## 2019-01-10 DIAGNOSIS — Z30.41 ORAL CONTRACEPTIVE PILL SURVEILLANCE: ICD-10-CM

## 2019-01-10 RX ORDER — NORGESTIMATE AND ETHINYL ESTRADIOL 0.25-0.035
1 KIT ORAL DAILY
Qty: 84 TABLET | Refills: 1 | Status: SHIPPED | OUTPATIENT
Start: 2019-01-10 | End: 2019-04-22 | Stop reason: SINTOL

## 2019-04-22 ENCOUNTER — OFFICE VISIT (OUTPATIENT)
Dept: FAMILY MEDICINE CLINIC | Facility: CLINIC | Age: 24
End: 2019-04-22
Payer: COMMERCIAL

## 2019-04-22 VITALS
RESPIRATION RATE: 14 BRPM | HEIGHT: 63 IN | SYSTOLIC BLOOD PRESSURE: 102 MMHG | TEMPERATURE: 98.3 F | BODY MASS INDEX: 26.4 KG/M2 | HEART RATE: 72 BPM | DIASTOLIC BLOOD PRESSURE: 60 MMHG | WEIGHT: 149 LBS

## 2019-04-22 DIAGNOSIS — R35.0 URINARY FREQUENCY: ICD-10-CM

## 2019-04-22 DIAGNOSIS — R10.2 PELVIC PAIN: ICD-10-CM

## 2019-04-22 DIAGNOSIS — Z30.41 SURVEILLANCE FOR BIRTH CONTROL, ORAL CONTRACEPTIVES: Primary | ICD-10-CM

## 2019-04-22 LAB
SL AMB  POCT GLUCOSE, UA: ABNORMAL
SL AMB LEUKOCYTE ESTERASE,UA: ABNORMAL
SL AMB POCT BILIRUBIN,UA: ABNORMAL
SL AMB POCT BLOOD,UA: 250
SL AMB POCT CLARITY,UA: CLEAR
SL AMB POCT COLOR,UA: YELLOW
SL AMB POCT KETONES,UA: ABNORMAL
SL AMB POCT NITRITE,UA: ABNORMAL
SL AMB POCT PH,UA: 5
SL AMB POCT SPECIFIC GRAVITY,UA: 1.02
SL AMB POCT URINE PROTEIN: ABNORMAL
SL AMB POCT UROBILINOGEN: ABNORMAL

## 2019-04-22 PROCEDURE — 3008F BODY MASS INDEX DOCD: CPT | Performed by: NURSE PRACTITIONER

## 2019-04-22 PROCEDURE — 1036F TOBACCO NON-USER: CPT | Performed by: NURSE PRACTITIONER

## 2019-04-22 PROCEDURE — 81003 URINALYSIS AUTO W/O SCOPE: CPT | Performed by: NURSE PRACTITIONER

## 2019-04-22 PROCEDURE — 99213 OFFICE O/P EST LOW 20 MIN: CPT | Performed by: NURSE PRACTITIONER

## 2019-04-22 RX ORDER — LEVONORGESTREL AND ETHINYL ESTRADIOL 0.1-0.02MG
1 KIT ORAL DAILY
Qty: 84 TABLET | Refills: 1 | Status: SHIPPED | OUTPATIENT
Start: 2019-04-22 | End: 2019-09-19 | Stop reason: SDUPTHER

## 2019-09-19 DIAGNOSIS — Z30.41 SURVEILLANCE FOR BIRTH CONTROL, ORAL CONTRACEPTIVES: ICD-10-CM

## 2019-09-19 RX ORDER — LEVONORGESTREL AND ETHINYL ESTRADIOL 0.1-0.02MG
1 KIT ORAL DAILY
Qty: 84 TABLET | Refills: 1 | Status: SHIPPED | OUTPATIENT
Start: 2019-09-19 | End: 2019-10-17 | Stop reason: DRUGHIGH

## 2019-10-17 ENCOUNTER — OFFICE VISIT (OUTPATIENT)
Dept: OBGYN CLINIC | Facility: CLINIC | Age: 24
End: 2019-10-17
Payer: COMMERCIAL

## 2019-10-17 VITALS
DIASTOLIC BLOOD PRESSURE: 60 MMHG | HEIGHT: 63 IN | SYSTOLIC BLOOD PRESSURE: 118 MMHG | WEIGHT: 143 LBS | BODY MASS INDEX: 25.34 KG/M2

## 2019-10-17 DIAGNOSIS — Z30.41 ENCOUNTER FOR SURVEILLANCE OF CONTRACEPTIVE PILLS: Primary | ICD-10-CM

## 2019-10-17 DIAGNOSIS — Z11.3 SCREENING EXAMINATION FOR STD (SEXUALLY TRANSMITTED DISEASE): ICD-10-CM

## 2019-10-17 DIAGNOSIS — N94.3 PMS (PREMENSTRUAL SYNDROME): ICD-10-CM

## 2019-10-17 PROCEDURE — 99385 PREV VISIT NEW AGE 18-39: CPT | Performed by: NURSE PRACTITIONER

## 2019-10-17 PROCEDURE — 87591 N.GONORRHOEAE DNA AMP PROB: CPT | Performed by: NURSE PRACTITIONER

## 2019-10-17 PROCEDURE — 87491 CHLMYD TRACH DNA AMP PROBE: CPT | Performed by: NURSE PRACTITIONER

## 2019-10-17 PROCEDURE — G0145 SCR C/V CYTO,THINLAYER,RESCR: HCPCS | Performed by: NURSE PRACTITIONER

## 2019-10-17 RX ORDER — DROSPIRENONE AND ETHINYL ESTRADIOL 0.02-3(28)
1 KIT ORAL DAILY
Qty: 84 TABLET | Refills: 0 | Status: SHIPPED | OUTPATIENT
Start: 2019-10-17 | End: 2020-02-14

## 2019-10-17 NOTE — PROGRESS NOTES
Assessment/Plan   Diagnoses and all orders for this visit:    PMS (premenstrual syndrome)  -     drospirenone-ethinyl estradiol (STEVIE) 3-0 02 MG per tablet; Take 1 tablet by mouth daily    Encounter for surveillance of contraceptive pills  -     drospirenone-ethinyl estradiol (STEVIE) 3-0 02 MG per tablet; Take 1 tablet by mouth daily  -     Liquid-based pap, screening    Screening examination for STD (sexually transmitted disease)  -     Hepatitis B surface antigen; Future  -     Hepatitis C antibody  -     HIV 1/2 AG-AB combo  -     RPR  -     Chlamydia/GC amplified DNA by PCR        Discussion    Reviewed with patient normal exam today  Pap with reflex HPV/GC/CT  done today  Rx for HIV/ Hep B/ Hep C/ RPR given  Discussed switching OCP to Stevie to see if we can help with PMS/ mood swings week before menses  Reviewed how to switch OCP, back up method first month when switching  Normal breast exam today  Monthly SBEs advised  Vitamin D and Calcim Supplements advised  Exercise most days of the week  Follow with PCP for regular check-ups and blood work  RTO 3 months for pill check or sooner as needed  Subjective     Michael Covarrubias is a 25 y o  female new patient who presents for annual well woman exam    Last exam 4 years ago  Pap Normal denies any hx of abnormal pap smears  Pap guidelines reviewed with patient  Pt would like Pap today  Pt denies any abnormal vaginal discharge, itching, or odor  Pt in a mutually exclusive relationship with a male partner and would like STD testing today  Menstrual Cycle:  LMP: 10/5/19  Period Cycle (Days): 21  Period Duration (Days): 5-6  Period Pattern: Regular  Menstrual Flow: Moderate  Menstrual Control: Tampon  Dysmenorrhea: None  Contraception: OCP Donald Johnson was recently switched from previous OCP to this one  Was previously on Sprintec  Complaints of moodiness week before menses  Feels very anxious and sad week before menses seems worse     OB History: G 1 P 1  Practices monthly SBEs, no breast complaints today  Denies any bowel or bladder issues  Pt follows with PCP for regular check-ups and blood work  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  Review of Systems   All other systems reviewed and are negative  The following portions of the patient's history were reviewed and updated as appropriate: allergies, current medications, past family history, past medical history, past social history, past surgical history and problem list     History reviewed  No pertinent past medical history  Past Surgical History:   Procedure Laterality Date    TYMPANOSTOMY TUBE PLACEMENT Bilateral     WISDOM TOOTH EXTRACTION         Family History   Problem Relation Age of Onset    No Known Problems Mother     Hypertension Father        Social History     Socioeconomic History    Marital status: Single     Spouse name: Not on file    Number of children: Not on file    Years of education: Not on file    Highest education level: Not on file   Occupational History    Not on file   Social Needs    Financial resource strain: Not on file    Food insecurity:     Worry: Not on file     Inability: Not on file    Transportation needs:     Medical: Not on file     Non-medical: Not on file   Tobacco Use    Smoking status: Never Smoker    Smokeless tobacco: Never Used   Substance and Sexual Activity    Alcohol use:  Yes    Drug use: No    Sexual activity: Yes     Partners: Male     Birth control/protection: Condom Male   Lifestyle    Physical activity:     Days per week: Not on file     Minutes per session: Not on file    Stress: Not on file   Relationships    Social connections:     Talks on phone: Not on file     Gets together: Not on file     Attends Hinduism service: Not on file     Active member of club or organization: Not on file     Attends meetings of clubs or organizations: Not on file     Relationship status: Not on file    Intimate partner violence:     Fear of current or ex partner: Not on file     Emotionally abused: Not on file     Physically abused: Not on file     Forced sexual activity: Not on file   Other Topics Concern    Not on file   Social History Narrative    Not on file         Current Outpatient Medications:     drospirenone-ethinyl estradiol (STEVIE) 3-0 02 MG per tablet, Take 1 tablet by mouth daily, Disp: 84 tablet, Rfl: 0    No Known Allergies    Objective   Vitals:    10/17/19 1322   BP: 118/60   BP Location: Right arm   Patient Position: Sitting   Cuff Size: Standard   Weight: 64 9 kg (143 lb)   Height: 5' 3" (1 6 m)     Physical Exam   Constitutional: She is oriented to person, place, and time  She appears well-developed and well-nourished  HENT:   Head: Normocephalic  Neck: Normal range of motion  Neck supple  No tracheal deviation present  No thyromegaly present  Cardiovascular: Normal rate, regular rhythm and normal heart sounds  Pulmonary/Chest: Effort normal and breath sounds normal  Right breast exhibits no inverted nipple, no mass, no nipple discharge, no skin change and no tenderness  Left breast exhibits no inverted nipple, no mass, no nipple discharge, no skin change and no tenderness  No breast tenderness, discharge or bleeding  Breasts are symmetrical    Abdominal: Soft  Bowel sounds are normal  She exhibits no distension and no mass  There is no tenderness  There is no rebound and no guarding  Genitourinary: Vagina normal and uterus normal  No breast tenderness, discharge or bleeding  No labial fusion  There is no rash, tenderness, lesion or injury on the right labia  There is no rash, tenderness, lesion or injury on the left labia  Cervix exhibits no motion tenderness, no discharge and no friability  Right adnexum displays no mass, no tenderness and no fullness  Left adnexum displays no mass, no tenderness and no fullness  Musculoskeletal: Normal range of motion     Neurological: She is alert and oriented to person, place, and time  Skin: Skin is warm and dry  Psychiatric: She has a normal mood and affect   Her behavior is normal  Judgment and thought content normal

## 2019-10-18 LAB
C TRACH DNA SPEC QL NAA+PROBE: NEGATIVE
N GONORRHOEA DNA SPEC QL NAA+PROBE: NEGATIVE

## 2019-10-22 LAB
LAB AP GYN PRIMARY INTERPRETATION: NORMAL
LAB AP LMP: NORMAL
Lab: NORMAL

## 2019-11-07 LAB
HCV AB S/CO SERPL IA: 0.04
HCV AB SERPL QL IA: NORMAL
HIV 1+2 AB+HIV1 P24 AG SERPL QL IA: NORMAL
RPR SER QL: NORMAL

## 2019-11-27 ENCOUNTER — OFFICE VISIT (OUTPATIENT)
Dept: URGENT CARE | Facility: CLINIC | Age: 24
End: 2019-11-27
Payer: COMMERCIAL

## 2019-11-27 VITALS
RESPIRATION RATE: 18 BRPM | SYSTOLIC BLOOD PRESSURE: 118 MMHG | HEIGHT: 62 IN | WEIGHT: 158 LBS | DIASTOLIC BLOOD PRESSURE: 66 MMHG | BODY MASS INDEX: 29.08 KG/M2 | OXYGEN SATURATION: 99 % | HEART RATE: 87 BPM | TEMPERATURE: 99.6 F

## 2019-11-27 DIAGNOSIS — J02.9 SORE THROAT: Primary | ICD-10-CM

## 2019-11-27 DIAGNOSIS — R68.89 FLU-LIKE SYMPTOMS: ICD-10-CM

## 2019-11-27 LAB — S PYO AG THROAT QL: NEGATIVE

## 2019-11-27 PROCEDURE — 87880 STREP A ASSAY W/OPTIC: CPT | Performed by: PREVENTIVE MEDICINE

## 2019-11-27 PROCEDURE — 99213 OFFICE O/P EST LOW 20 MIN: CPT | Performed by: PREVENTIVE MEDICINE

## 2019-11-27 PROCEDURE — 87070 CULTURE OTHR SPECIMN AEROBIC: CPT | Performed by: PREVENTIVE MEDICINE

## 2019-11-27 RX ORDER — OSELTAMIVIR PHOSPHATE 75 MG/1
75 CAPSULE ORAL EVERY 12 HOURS SCHEDULED
Qty: 10 CAPSULE | Refills: 0 | Status: SHIPPED | OUTPATIENT
Start: 2019-11-27 | End: 2019-12-02

## 2019-11-27 NOTE — PATIENT INSTRUCTIONS
Influenza   AMBULATORY CARE:   Influenza  (the flu) is an infection caused by the influenza virus  The flu is easily spread when an infected person coughs, sneezes, or has close contact with others  You may be able to spread the flu to others for 1 week or longer after signs or symptoms appear  Common signs and symptoms include the following:   · Fever and chills    · Headaches, body aches, and muscle or joint pain    · Cough, runny nose, and sore throat    · Loss of appetite, nausea, vomiting, or diarrhea    · Tiredness    · Trouble breathing  Call 911 for any of the following:   · You have trouble breathing, and your lips look purple or blue  · You have a seizure  Seek care immediately if:   · You are dizzy, or you are urinating less or not at all  · You have a headache with a stiff neck, and you feel tired or confused  · You have new pain or pressure in your chest     · Your symptoms, such as shortness of breath, vomiting, or diarrhea, get worse  · Your symptoms, such as fever and coughing, seem to get better, but then get worse  Contact your healthcare provider if:   · You have new muscle pain or weakness  · You have questions or concerns about your condition or care  Treatment for influenza  may include any of the following:  · Acetaminophen  decreases pain and fever  It is available without a doctor's order  Ask how much to take and how often to take it  Follow directions  Acetaminophen can cause liver damage if not taken correctly  · NSAIDs , such as ibuprofen, help decrease swelling, pain, and fever  This medicine is available with or without a doctor's order  NSAIDs can cause stomach bleeding or kidney problems in certain people  If you take blood thinner medicine, always ask your healthcare provider if NSAIDs are safe for you  Always read the medicine label and follow directions  · Antivirals  help fight a viral infection    Manage your symptoms:   · Rest  as much as you can to help you recover  · Drink liquids as directed  to help prevent dehydration  Ask how much liquid to drink each day and which liquids are best for you  Prevent the spread of the flu:   · Wash your hands often  Use soap and water  Wash your hands after you use the bathroom, change a child's diapers, or sneeze  Wash your hands before you prepare or eat food  Use gel hand cleanser when soap and water are not available  Do not touch your eyes, nose, or mouth unless you have washed your hands first            · Cover your mouth when you sneeze or cough  Cough into a tissue or the bend of your arm  · Clean shared items with a germ-killing   Clean table surfaces, doorknobs, and light switches  Do not share towels, silverware, and dishes with people who are sick  Wash bed sheets, towels, silverware, and dishes with soap and water  · Wear a mask  over your mouth and nose if you are sick or are near anyone who is sick  · Stay away from others  if you are sick  · Influenza vaccine  helps prevent influenza (flu)  Everyone older than 6 months should get a yearly influenza vaccine  Get the vaccine as soon as it is available, usually in September or October each year  Follow up with your healthcare provider as directed:  Write down your questions so you remember to ask them during your visits  © 2017 [employer] Information is for End User's use only and may not be sold, redistributed or otherwise used for commercial purposes  All illustrations and images included in CareNotes® are the copyrighted property of A D [employer] M [employer] Inc  or Eugene Campos  The above information is an  only  It is not intended as medical advice for individual conditions or treatments  Talk to your doctor, nurse or pharmacist before following any medical regimen to see if it is safe and effective for you

## 2019-11-27 NOTE — LETTER
November 27, 2019     Patient: Elva Cody   YOB: 1995   Date of Visit: 11/27/2019       To Whom It May Concern: It is my medical opinion that Nik Duarte may return to work on 11/30/2019  If you have any questions or concerns, please don't hesitate to call           Sincerely,        Zachery Grant MD    CC: No Recipients

## 2019-11-29 LAB — BACTERIA THROAT CULT: NORMAL

## 2019-11-29 NOTE — PROGRESS NOTES
[facility]        NAME: Sarah Bianchi is a 25 y o  female  : 1995    MRN: [id number]  DATE: 2019  TIME: 3:40 PM    Assessment and Plan   Sore throat [J02 9]  1  Sore throat  POCT rapid strepA    Throat culture   2  Flu-like symptoms  oseltamivir (TAMIFLU) 75 mg capsule         Patient Instructions       Follow up with PCP in 3-5 days  Proceed to  ER if symptoms worsen  Chief Complaint     Chief Complaint   Patient presents with    Sore Throat     sore throat daughter has strep         History of Present Illness       Sore Throat    This is a new problem  The current episode started yesterday  The problem has been unchanged  The maximum temperature recorded prior to her arrival was 100 4 - 100 9 F  The fever has been present for less than 1 day  The pain is at a severity of 6/10  The pain is moderate  Associated symptoms include congestion, coughing, ear pain, headaches and a hoarse voice  She has tried nothing for the symptoms  The treatment provided no relief  Review of Systems   Review of Systems   Constitutional: Negative  HENT: Positive for congestion, ear pain, hoarse voice and sore throat  Respiratory: Positive for cough  Cardiovascular: Negative  Allergic/Immunologic: Negative  Neurological: Positive for headaches           Current Medications       Current Outpatient Medications:     drospirenone-ethinyl estradiol (STEVIE) 3-0 02 MG per tablet, Take 1 tablet by mouth daily, Disp: 84 tablet, Rfl: 0    oseltamivir (TAMIFLU) 75 mg capsule, Take 1 capsule (75 mg total) by mouth every 12 (twelve) hours for 5 days, Disp: 10 capsule, Rfl: 0    Current Allergies     Allergies as of 2019    (No Known Allergies)            The following portions of the patient's history were reviewed and updated as appropriate: allergies, current medications, past family history, past medical history, past social history, past surgical history and problem list      History reviewed  No pertinent past medical history  Past Surgical History:   Procedure Laterality Date    TYMPANOSTOMY TUBE PLACEMENT Bilateral     WISDOM TOOTH EXTRACTION         Family History   Problem Relation Age of Onset    No Known Problems Mother     Hypertension Father          Medications have been verified  Objective   /66   Pulse 87   Temp 99 6 °F (37 6 °C)   Resp 18   Ht 5' 2" (1 575 m)   Wt 71 7 kg (158 lb)   LMP 11/06/2019   SpO2 99%   BMI 28 90 kg/m²        Physical Exam     Physical Exam   Constitutional: She appears well-developed and well-nourished  HENT:   Head: Normocephalic  Right Ear: External ear normal    Left Ear: External ear normal    Nose: Mucosal edema and rhinorrhea present  Mouth/Throat: Posterior oropharyngeal erythema present  No oropharyngeal exudate or posterior oropharyngeal edema  Neck: Normal range of motion  Cardiovascular: Normal rate, regular rhythm and normal heart sounds  Pulmonary/Chest: Effort normal  She has no wheezes  Lymphadenopathy:     She has no cervical adenopathy  Skin: Skin is warm  No rash noted  No pallor  Nursing note and vitals reviewed

## 2020-02-13 DIAGNOSIS — Z30.41 ENCOUNTER FOR SURVEILLANCE OF CONTRACEPTIVE PILLS: ICD-10-CM

## 2020-02-13 DIAGNOSIS — N94.3 PMS (PREMENSTRUAL SYNDROME): ICD-10-CM

## 2020-02-14 RX ORDER — DROSPIRENONE AND ETHINYL ESTRADIOL 0.02-3(28)
1 KIT ORAL DAILY
Qty: 84 TABLET | Refills: 0 | Status: SHIPPED | OUTPATIENT
Start: 2020-02-14 | End: 2020-04-27

## 2020-04-26 DIAGNOSIS — Z30.41 ENCOUNTER FOR SURVEILLANCE OF CONTRACEPTIVE PILLS: ICD-10-CM

## 2020-04-26 DIAGNOSIS — N94.3 PMS (PREMENSTRUAL SYNDROME): ICD-10-CM

## 2020-04-27 RX ORDER — DROSPIRENONE AND ETHINYL ESTRADIOL 0.02-3(28)
1 KIT ORAL DAILY
Qty: 84 TABLET | Refills: 0 | Status: SHIPPED | OUTPATIENT
Start: 2020-04-27 | End: 2021-01-28 | Stop reason: ALTCHOICE

## 2020-11-01 ENCOUNTER — HOSPITAL ENCOUNTER (EMERGENCY)
Facility: HOSPITAL | Age: 25
Discharge: HOME | End: 2020-11-02
Attending: EMERGENCY MEDICINE
Payer: COMMERCIAL

## 2020-11-01 ENCOUNTER — APPOINTMENT (EMERGENCY)
Dept: RADIOLOGY | Facility: HOSPITAL | Age: 25
End: 2020-11-01
Payer: COMMERCIAL

## 2020-11-01 DIAGNOSIS — R51.9 SUDDEN ONSET OF SEVERE HEADACHE: Primary | ICD-10-CM

## 2020-11-01 DIAGNOSIS — R51.9 HEADACHE: ICD-10-CM

## 2020-11-01 LAB
ALBUMIN SERPL BCP-MCNC: 4.5 G/DL (ref 3.5–5)
ALP SERPL-CCNC: 67 U/L (ref 46–116)
ALT SERPL W P-5'-P-CCNC: 12 U/L (ref 12–78)
ANION GAP SERPL CALCULATED.3IONS-SCNC: 13 MMOL/L (ref 4–13)
AST SERPL W P-5'-P-CCNC: 12 U/L (ref 5–45)
BASOPHILS # BLD AUTO: 0.05 THOUSANDS/ΜL (ref 0–0.1)
BASOPHILS NFR BLD AUTO: 0 % (ref 0–1)
BILIRUB SERPL-MCNC: 0.6 MG/DL (ref 0.2–1)
BUN SERPL-MCNC: 16 MG/DL (ref 5–25)
CALCIUM SERPL-MCNC: 9.5 MG/DL (ref 8.3–10.1)
CHLORIDE SERPL-SCNC: 102 MMOL/L (ref 100–108)
CO2 SERPL-SCNC: 25 MMOL/L (ref 21–32)
CREAT SERPL-MCNC: 0.67 MG/DL (ref 0.6–1.3)
EOSINOPHIL # BLD AUTO: 0.01 THOUSAND/ΜL (ref 0–0.61)
EOSINOPHIL NFR BLD AUTO: 0 % (ref 0–6)
ERYTHROCYTE [DISTWIDTH] IN BLOOD BY AUTOMATED COUNT: 13.2 % (ref 11.6–15.1)
GFR SERPL CREATININE-BSD FRML MDRD: 123 ML/MIN/1.73SQ M
GLUCOSE SERPL-MCNC: 110 MG/DL (ref 65–140)
HCT VFR BLD AUTO: 44.5 % (ref 34.8–46.1)
HGB BLD-MCNC: 14.3 G/DL (ref 11.5–15.4)
IMM GRANULOCYTES # BLD AUTO: 0.05 THOUSAND/UL (ref 0–0.2)
IMM GRANULOCYTES NFR BLD AUTO: 0 % (ref 0–2)
LYMPHOCYTES # BLD AUTO: 0.96 THOUSANDS/ΜL (ref 0.6–4.47)
LYMPHOCYTES NFR BLD AUTO: 8 % (ref 14–44)
MCH RBC QN AUTO: 27.6 PG (ref 26.8–34.3)
MCHC RBC AUTO-ENTMCNC: 32.1 G/DL (ref 31.4–37.4)
MCV RBC AUTO: 86 FL (ref 82–98)
MONOCYTES # BLD AUTO: 0.52 THOUSAND/ΜL (ref 0.17–1.22)
MONOCYTES NFR BLD AUTO: 4 % (ref 4–12)
NEUTROPHILS # BLD AUTO: 10.37 THOUSANDS/ΜL (ref 1.85–7.62)
NEUTS SEG NFR BLD AUTO: 88 % (ref 43–75)
NRBC BLD AUTO-RTO: 0 /100 WBCS
PLATELET # BLD AUTO: 237 THOUSANDS/UL (ref 149–390)
PMV BLD AUTO: 11.6 FL (ref 8.9–12.7)
POTASSIUM SERPL-SCNC: 3.1 MMOL/L (ref 3.5–5.3)
PROT SERPL-MCNC: 8.5 G/DL (ref 6.4–8.2)
RBC # BLD AUTO: 5.18 MILLION/UL (ref 3.81–5.12)
SODIUM SERPL-SCNC: 140 MMOL/L (ref 136–145)
WBC # BLD AUTO: 11.96 THOUSAND/UL (ref 4.31–10.16)

## 2020-11-01 PROCEDURE — 96374 THER/PROPH/DIAG INJ IV PUSH: CPT

## 2020-11-01 PROCEDURE — G1004 CDSM NDSC: HCPCS

## 2020-11-01 PROCEDURE — 62270 DX LMBR SPI PNXR: CPT | Performed by: EMERGENCY MEDICINE

## 2020-11-01 PROCEDURE — 85025 COMPLETE CBC W/AUTO DIFF WBC: CPT | Performed by: EMERGENCY MEDICINE

## 2020-11-01 PROCEDURE — 80053 COMPREHEN METABOLIC PANEL: CPT | Performed by: EMERGENCY MEDICINE

## 2020-11-01 PROCEDURE — U0003 INFECTIOUS AGENT DETECTION BY NUCLEIC ACID (DNA OR RNA); SEVERE ACUTE RESPIRATORY SYNDROME CORONAVIRUS 2 (SARS-COV-2) (CORONAVIRUS DISEASE [COVID-19]), AMPLIFIED PROBE TECHNIQUE, MAKING USE OF HIGH THROUGHPUT TECHNOLOGIES AS DESCRIBED BY CMS-2020-01-R: HCPCS | Performed by: EMERGENCY MEDICINE

## 2020-11-01 PROCEDURE — 99284 EMERGENCY DEPT VISIT MOD MDM: CPT

## 2020-11-01 PROCEDURE — 70498 CT ANGIOGRAPHY NECK: CPT

## 2020-11-01 PROCEDURE — 99285 EMERGENCY DEPT VISIT HI MDM: CPT | Performed by: EMERGENCY MEDICINE

## 2020-11-01 PROCEDURE — 96361 HYDRATE IV INFUSION ADD-ON: CPT

## 2020-11-01 PROCEDURE — 36415 COLL VENOUS BLD VENIPUNCTURE: CPT | Performed by: EMERGENCY MEDICINE

## 2020-11-01 PROCEDURE — 70496 CT ANGIOGRAPHY HEAD: CPT

## 2020-11-01 RX ORDER — ONDANSETRON 2 MG/ML
4 INJECTION INTRAMUSCULAR; INTRAVENOUS ONCE
Status: COMPLETED | OUTPATIENT
Start: 2020-11-01 | End: 2020-11-01

## 2020-11-01 RX ADMIN — IOHEXOL 85 ML: 350 INJECTION, SOLUTION INTRAVENOUS at 22:55

## 2020-11-01 RX ADMIN — ONDANSETRON 4 MG: 2 INJECTION INTRAMUSCULAR; INTRAVENOUS at 22:14

## 2020-11-01 RX ADMIN — SODIUM CHLORIDE 1000 ML: 0.9 INJECTION, SOLUTION INTRAVENOUS at 22:14

## 2020-11-02 VITALS
OXYGEN SATURATION: 99 % | DIASTOLIC BLOOD PRESSURE: 55 MMHG | HEART RATE: 97 BPM | BODY MASS INDEX: 25.06 KG/M2 | WEIGHT: 137 LBS | RESPIRATION RATE: 20 BRPM | SYSTOLIC BLOOD PRESSURE: 90 MMHG | TEMPERATURE: 99.5 F

## 2020-11-02 LAB
APPEARANCE CSF: CLEAR
APPEARANCE CSF: CLEAR
GLUCOSE CSF-MCNC: 70 MG/DL (ref 50–80)
GRAM STN SPEC: NORMAL
PROT CSF-MCNC: 26 MG/DL (ref 15–45)
RBC # CSF MANUAL: 1 UL (ref 0–10)
RBC # CSF MANUAL: 146 UL (ref 0–10)
TOTAL CELLS COUNTED BLD: NO
TOTAL CELLS COUNTED BLD: NO
TUBE # CSF: 1
TUBE # CSF: 4
WBC # CSF AUTO: 0 /UL (ref 0–5)
WBC # CSF AUTO: 0 /UL (ref 0–5)

## 2020-11-02 PROCEDURE — 89050 BODY FLUID CELL COUNT: CPT | Performed by: EMERGENCY MEDICINE

## 2020-11-02 PROCEDURE — 82945 GLUCOSE OTHER FLUID: CPT | Performed by: EMERGENCY MEDICINE

## 2020-11-02 PROCEDURE — 87070 CULTURE OTHR SPECIMN AEROBIC: CPT | Performed by: EMERGENCY MEDICINE

## 2020-11-02 PROCEDURE — 84157 ASSAY OF PROTEIN OTHER: CPT | Performed by: EMERGENCY MEDICINE

## 2020-11-02 PROCEDURE — 96375 TX/PRO/DX INJ NEW DRUG ADDON: CPT

## 2020-11-02 PROCEDURE — 86617 LYME DISEASE ANTIBODY: CPT | Performed by: EMERGENCY MEDICINE

## 2020-11-02 PROCEDURE — 89051 BODY FLUID CELL COUNT: CPT | Performed by: EMERGENCY MEDICINE

## 2020-11-02 RX ORDER — KETOROLAC TROMETHAMINE 30 MG/ML
15 INJECTION, SOLUTION INTRAMUSCULAR; INTRAVENOUS ONCE
Status: COMPLETED | OUTPATIENT
Start: 2020-11-02 | End: 2020-11-02

## 2020-11-02 RX ORDER — DIPHENHYDRAMINE HYDROCHLORIDE 50 MG/ML
50 INJECTION INTRAMUSCULAR; INTRAVENOUS ONCE
Status: COMPLETED | OUTPATIENT
Start: 2020-11-02 | End: 2020-11-02

## 2020-11-02 RX ORDER — METOCLOPRAMIDE 10 MG/1
10 TABLET ORAL EVERY 6 HOURS
Qty: 10 TABLET | Refills: 0 | Status: SHIPPED | OUTPATIENT
Start: 2020-11-02 | End: 2020-11-03

## 2020-11-02 RX ORDER — BUTALBITAL, ACETAMINOPHEN AND CAFFEINE 50; 325; 40 MG/1; MG/1; MG/1
1 TABLET ORAL EVERY 4 HOURS PRN
Qty: 12 TABLET | Refills: 0 | Status: SHIPPED | OUTPATIENT
Start: 2020-11-02 | End: 2020-11-05

## 2020-11-02 RX ADMIN — DIPHENHYDRAMINE HYDROCHLORIDE 50 MG: 50 INJECTION, SOLUTION INTRAMUSCULAR; INTRAVENOUS at 00:45

## 2020-11-02 RX ADMIN — KETOROLAC TROMETHAMINE 15 MG: 30 INJECTION, SOLUTION INTRAMUSCULAR at 00:43

## 2020-11-02 RX ADMIN — MORPHINE SULFATE 2 MG: 2 INJECTION, SOLUTION INTRAMUSCULAR; INTRAVENOUS at 00:03

## 2020-11-03 ENCOUNTER — TELEMEDICINE (OUTPATIENT)
Dept: FAMILY MEDICINE CLINIC | Facility: CLINIC | Age: 25
End: 2020-11-03
Payer: COMMERCIAL

## 2020-11-03 DIAGNOSIS — M62.838 NECK MUSCLE SPASM: ICD-10-CM

## 2020-11-03 DIAGNOSIS — Z09 FOLLOW UP: Primary | ICD-10-CM

## 2020-11-03 DIAGNOSIS — R51.9 ACUTE INTRACTABLE HEADACHE, UNSPECIFIED HEADACHE TYPE: ICD-10-CM

## 2020-11-03 DIAGNOSIS — R11.0 NAUSEA: ICD-10-CM

## 2020-11-03 LAB — SARS-COV-2 RNA SPEC QL NAA+PROBE: NOT DETECTED

## 2020-11-03 PROCEDURE — 99213 OFFICE O/P EST LOW 20 MIN: CPT | Performed by: NURSE PRACTITIONER

## 2020-11-03 PROCEDURE — 3725F SCREEN DEPRESSION PERFORMED: CPT | Performed by: NURSE PRACTITIONER

## 2020-11-03 RX ORDER — PREDNISONE 10 MG/1
TABLET ORAL
Qty: 7 TABLET | Refills: 1 | Status: SHIPPED | OUTPATIENT
Start: 2020-11-03 | End: 2021-01-28 | Stop reason: ALTCHOICE

## 2020-11-03 RX ORDER — PROCHLORPERAZINE MALEATE 5 MG/1
5 TABLET ORAL EVERY 12 HOURS PRN
Qty: 20 TABLET | Refills: 0 | Status: SHIPPED | OUTPATIENT
Start: 2020-11-03 | End: 2021-01-28 | Stop reason: ALTCHOICE

## 2020-11-03 RX ORDER — CYCLOBENZAPRINE HCL 5 MG
5 TABLET ORAL 3 TIMES DAILY PRN
Qty: 30 TABLET | Refills: 0 | Status: SHIPPED | OUTPATIENT
Start: 2020-11-03 | End: 2021-01-28 | Stop reason: ALTCHOICE

## 2020-11-05 LAB — BACTERIA CSF CULT: NO GROWTH

## 2020-11-06 ENCOUNTER — TELEMEDICINE (OUTPATIENT)
Dept: FAMILY MEDICINE CLINIC | Facility: CLINIC | Age: 25
End: 2020-11-06
Payer: COMMERCIAL

## 2020-11-06 DIAGNOSIS — Z09 FOLLOW UP: Primary | ICD-10-CM

## 2020-11-06 DIAGNOSIS — R51.9 ACUTE INTRACTABLE HEADACHE, UNSPECIFIED HEADACHE TYPE: ICD-10-CM

## 2020-11-06 DIAGNOSIS — M62.838 NECK MUSCLE SPASM: ICD-10-CM

## 2020-11-06 PROCEDURE — 1036F TOBACCO NON-USER: CPT | Performed by: NURSE PRACTITIONER

## 2020-11-06 PROCEDURE — 99213 OFFICE O/P EST LOW 20 MIN: CPT | Performed by: NURSE PRACTITIONER

## 2020-11-11 ENCOUNTER — TELEPHONE (OUTPATIENT)
Dept: NEUROLOGY | Facility: CLINIC | Age: 25
End: 2020-11-11

## 2020-11-17 LAB
B BURGDOR IGG PATRN CSF IB-IMP: NEGATIVE
B BURGDOR IGM PATRN CSF IB-IMP: NEGATIVE
B BURGDOR18KD IGG CSF QL IB: NORMAL
B BURGDOR23KD IGG CSF QL IB: NORMAL
B BURGDOR23KD IGM CSF QL IB: NORMAL
B BURGDOR28KD IGG CSF QL IB: NORMAL
B BURGDOR30KD IGG CSF QL IB: NORMAL
B BURGDOR39KD IGG CSF QL IB: NORMAL
B BURGDOR39KD IGM CSF QL IB: NORMAL
B BURGDOR41KD IGG CSF QL IB: NORMAL
B BURGDOR41KD IGM CSF QL IB: NORMAL
B BURGDOR45KD IGG CSF QL IB: NORMAL
B BURGDOR58KD IGG CSF QL IB: NORMAL
B BURGDOR66KD IGG CSF QL IB: NORMAL
B BURGDOR93KD IGG CSF QL IB: NORMAL

## 2021-01-20 ENCOUNTER — TELEMEDICINE (OUTPATIENT)
Dept: FAMILY MEDICINE CLINIC | Facility: CLINIC | Age: 26
End: 2021-01-20
Payer: COMMERCIAL

## 2021-01-20 DIAGNOSIS — Z20.822 EXPOSURE TO COVID-19 VIRUS: Primary | ICD-10-CM

## 2021-01-20 DIAGNOSIS — Z20.822 EXPOSURE TO COVID-19 VIRUS: ICD-10-CM

## 2021-01-20 PROCEDURE — U0005 INFEC AGEN DETEC AMPLI PROBE: HCPCS | Performed by: FAMILY MEDICINE

## 2021-01-20 PROCEDURE — U0003 INFECTIOUS AGENT DETECTION BY NUCLEIC ACID (DNA OR RNA); SEVERE ACUTE RESPIRATORY SYNDROME CORONAVIRUS 2 (SARS-COV-2) (CORONAVIRUS DISEASE [COVID-19]), AMPLIFIED PROBE TECHNIQUE, MAKING USE OF HIGH THROUGHPUT TECHNOLOGIES AS DESCRIBED BY CMS-2020-01-R: HCPCS | Performed by: FAMILY MEDICINE

## 2021-01-20 PROCEDURE — 1036F TOBACCO NON-USER: CPT | Performed by: FAMILY MEDICINE

## 2021-01-20 PROCEDURE — 99212 OFFICE O/P EST SF 10 MIN: CPT | Performed by: FAMILY MEDICINE

## 2021-01-20 NOTE — PROGRESS NOTES
COVID-19 Virtual Visit     Assessment/Plan:    Problem List Items Addressed This Visit     None      Visit Diagnoses     Exposure to COVID-19 virus    -  Primary    Relevant Orders    Novel Coronavirus (Covid-19),PCR [facility]         Disposition:     I recommended COVID-19 PCR testing on or after day 5 since last exposure and if negative can end quarantine after 7 days  Patient was instructed to watch for symptoms until 14 days after exposure  If patient were to develop symptoms, they should immediately self isolate and call our office for further guidance  I have spent 5 minutes directly with the patient  Encounter provider Hilary Snyder MD    Provider located at [address] [place] [phone]    Recent Visits  No visits were found meeting these conditions  Showing recent visits within past 7 days and meeting all other requirements     Today's Visits  Date Type Provider Dept   01/20/21 Telemedicine Hilary Snyder  [facility] today's visits and meeting all other requirements     Future Appointments  No visits were found meeting these conditions  Showing future appointments within next 150 days and meeting all other requirements        Patient agrees to participate in a virtual check in via telephone or video visit instead of presenting to the office to address urgent/immediate medical needs  Patient is aware this is a billable service  After connecting through Telephone, the patient was identified by name and date of birth  [facility] was informed that this was a telemedicine visit and that the exam was being conducted confidentially over secure lines  My office door was closed  No one else was in the room  [facility] acknowledged consent and understanding of privacy and security of the telemedicine visit   I informed the patient that I have reviewed her record in [address] [place] [address] Rd and presented the opportunity for her to ask any questions regarding the visit today  The patient agreed to participate  Subjective:   Garth Giron is a 22 y o  female who is concerned about COVID-19  Patient is currently asymptomatic  Patient denies fever, chills, fatigue, malaise, congestion, rhinorrhea, sore throat, anosmia, loss of taste, cough, shortness of breath, chest tightness, abdominal pain, nausea, vomiting, diarrhea, myalgias and headaches  Date of exposure: 1/15/2021    Exposure:   Contact with a person who is under investigation (PUI) for or who is positive for COVID-19 within the last 14 days?: Yes    Hospitalized recently for fever and/or lower respiratory symptoms?: No      Currently a healthcare worker that is involved in direct patient care?: No      Works in a special setting where the risk of COVID-19 transmission may be high? (this may include long-term care, correctional and intermediate facilities; homeless shelters; assisted-living facilities and group homes ): No      Resident in a special setting where the risk of COVID-19 transmission may be high? (this may include long-term care, correctional and intermediate facilities; homeless shelters; assisted-living facilities and group homes ): No      Lab Results   Component Value Date    SARSCOV2 Not Detected 11/01/2020     No past medical history on file    Past Surgical History:   Procedure Laterality Date    TYMPANOSTOMY TUBE PLACEMENT Bilateral     WISDOM TOOTH EXTRACTION       Current Outpatient Medications   Medication Sig Dispense Refill    cyclobenzaprine (FLEXERIL) 5 mg tablet Take 1 tablet (5 mg total) by mouth 3 (three) times a day as needed for muscle spasms 30 tablet 0    drospirenone-ethinyl estradiol (STEVIE) 3-0 02 MG per tablet take 1 tablet by mouth daily (Patient not taking: Reported on 11/1/2020) 84 tablet 0    predniSONE 10 mg tablet Take 2 tabs daily for 2 days then 1 tab daily for 2 days, then 1/2 tab daily for 2 days then stop 7 tablet 1    prochlorperazine (COMPAZINE) 5 mg tablet Take 1 tablet (5 mg total) by mouth every 12 (twelve) hours as needed for nausea or vomiting 20 tablet 0     No current facility-administered medications for this visit  No Known Allergies    Review of Systems   Constitutional: Negative for chills, fatigue and fever  HENT: Negative for congestion, rhinorrhea and sore throat  Respiratory: Negative for cough, chest tightness and shortness of breath  Gastrointestinal: Negative for abdominal pain, diarrhea, nausea and vomiting  Musculoskeletal: Negative for myalgias  Neurological: Negative for headaches  Objective: There were no vitals filed for this visit  Physical Exam  VIRTUAL VISIT DISCLAIMER    [place] Ami acknowledges that she has consented to an online visit or consultation  She understands that the online visit is based solely on information provided by her, and that, in the absence of a face-to-face physical evaluation by the physician, the diagnosis she receives is both limited and provisional in terms of accuracy and completeness  This is not intended to replace a full medical face-to-face evaluation by the physician  Cynthia Mueller understands and accepts these terms

## 2021-01-21 LAB — SARS-COV-2 RNA RESP QL NAA+PROBE: NEGATIVE

## 2021-01-22 ENCOUNTER — TELEPHONE (OUTPATIENT)
Dept: FAMILY MEDICINE CLINIC | Facility: CLINIC | Age: 26
End: 2021-01-22

## 2021-01-22 NOTE — TELEPHONE ENCOUNTER
----- Message from Brook Deras MD sent at 1/22/2021  9:14 AM EST -----  Pl, advise pt -  Negative COVID test

## 2021-01-22 NOTE — LETTER
January 22, 2021    Patient:  Carlos Broussard  YOB: 1995  Date of Last Encounter: 1/20/2021    To whom it may concern:    Carlos Broussard has tested negative for COVID-19 (Coronavirus)         Sincerely,    Elsa Phillips MD

## 2021-01-28 ENCOUNTER — OFFICE VISIT (OUTPATIENT)
Dept: OBGYN CLINIC | Facility: CLINIC | Age: 26
End: 2021-01-28
Payer: COMMERCIAL

## 2021-01-28 VITALS
HEIGHT: 63 IN | DIASTOLIC BLOOD PRESSURE: 60 MMHG | SYSTOLIC BLOOD PRESSURE: 115 MMHG | WEIGHT: 148 LBS | BODY MASS INDEX: 26.22 KG/M2

## 2021-01-28 DIAGNOSIS — Z30.09 CONTRACEPTIVE EDUCATION: Primary | ICD-10-CM

## 2021-01-28 DIAGNOSIS — Z11.3 SCREEN FOR STD (SEXUALLY TRANSMITTED DISEASE): ICD-10-CM

## 2021-01-28 PROCEDURE — 99214 OFFICE O/P EST MOD 30 MIN: CPT | Performed by: NURSE PRACTITIONER

## 2021-01-28 PROCEDURE — 3008F BODY MASS INDEX DOCD: CPT | Performed by: FAMILY MEDICINE

## 2021-01-28 NOTE — PROGRESS NOTES
Assessment/Plan:    Contraceptive education  Contraceptive options discussed such as OCP, patch, ring, injection, implant, IUD  Pt most interested in Mirena IUD  Reviewed how inserted and removed  Reviewed risk of uterine perforation, migration, expulsion  Risk of ectopic pregnancy  Risk that could require surgery and lead to infertility  Common bleeding patterns reviewed, as well as common side effects  Will call insurance for approval, once approved will call to schedule  Information packet given  Will insert at end of menses with negative UPT in office  RTO for Mirena IUD insertion  Screen for STD (sexually transmitted disease)  Pt would like STD testing  GC/CT/Trich testing off of urine  Will call with results and follow up accordingly  Diagnoses and all orders for this visit:    Contraceptive education    Screen for STD (sexually transmitted disease)  -     Chlamydia/GC amplified DNA by PCR  -     Trichomonas Vaginalis, [place]          Subjective:      Patient ID: Silverio Dubose is a 22 y o  female  Pt here for contraceptive discussion as well as STD testing  Has stopped OCP in August, as no longer needed contraception, broke up with boyfriend  Since stopping OCP mood seemed better, lost weight, in general felt much better  Has tried several pills in past all with unwanted side effects  Most interested in IUD but would like to discuss  LMP 1/20/21  Menses monthly, regular, 28 day cycle, lasting about 5 days  Moderate flow  Week prior to menses, mood swings, breast tenderness, cramping  Currently sexually active, in a monogamous relationship  Would like STD testing today  Declines HIV/ Hep B/ Hep C/ RPR  Uses condoms with intercourse  Current partner x several weeks  No known STD exposure            The following portions of the patient's history were reviewed and updated as appropriate: allergies, current medications, past family history, past medical history, past social history, past surgical history and problem list     Review of Systems   All other systems reviewed and are negative  Objective:  /60 (BP Location: Right arm, Patient Position: Sitting, Cuff Size: Large)   Ht 5' 3" (1 6 m)   Wt 67 1 kg (148 lb)   LMP 01/20/2021 (Exact Date)   BMI 26 22 kg/m²      Physical Exam  Vitals signs and nursing note reviewed  Constitutional:       Appearance: Normal appearance  Neck:      Musculoskeletal: Normal range of motion  Cardiovascular:      Pulses: Normal pulses  Heart sounds: Normal heart sounds  Pulmonary:      Effort: Pulmonary effort is normal       Breath sounds: Normal breath sounds  Abdominal:      General: Abdomen is flat  Bowel sounds are normal       Palpations: Abdomen is soft  Musculoskeletal: Normal range of motion  Skin:     General: Skin is warm and dry  Neurological:      Mental Status: She is alert  Psychiatric:         Mood and Affect: Mood normal          Behavior: Behavior normal          Thought Content:  Thought content normal          Judgment: Judgment normal

## 2021-01-28 NOTE — ASSESSMENT & PLAN NOTE
Contraceptive options discussed such as OCP, patch, ring, injection, implant, IUD  Pt most interested in Mirena IUD  Reviewed how inserted and removed  Reviewed risk of uterine perforation, migration, expulsion  Risk of ectopic pregnancy  Risk that could require surgery and lead to infertility  Common bleeding patterns reviewed, as well as common side effects  Will call insurance for approval, once approved will call to schedule  Information packet given  Will insert at end of menses with negative UPT in office  RTO for Mirena IUD insertion

## 2021-01-29 PROBLEM — Z11.3 SCREEN FOR STD (SEXUALLY TRANSMITTED DISEASE): Status: ACTIVE | Noted: 2021-01-29

## 2021-01-29 NOTE — ASSESSMENT & PLAN NOTE
Pt would like STD testing  GC/CT/Trich testing off of urine  Will call with results and follow up accordingly

## 2021-02-05 LAB
C TRACH RRNA SPEC QL NAA+PROBE: NEGATIVE
N GONORRHOEA RRNA SPEC QL NAA+PROBE: NEGATIVE
T VAGINALIS RRNA SPEC QL NAA+PROBE: NEGATIVE

## 2021-02-08 ENCOUNTER — TELEPHONE (OUTPATIENT)
Dept: OBGYN CLINIC | Facility: CLINIC | Age: 26
End: 2021-02-08

## 2021-02-08 NOTE — TELEPHONE ENCOUNTER
T/c from patient states her period came 2 weeks early c/o vaginal bleeding (Heavy) with clots), not currently using an OCP and dose not believe she can be pregnant , other than bleeding and clots she also has a headache   Discussed with Damaris Fox , advise patient to watch for signs of excessive bleeding light headedness ,fever, feeling like she is going to pass out and if she is soak a large pad needing to change every hour on the hour will need a STA eval at ER   Also discussed waiting to till next period for IUD insertion since we are not aware of benefit coverage at time , follow up as needed    KEVIN CMA

## 2021-02-08 NOTE — TELEPHONE ENCOUNTER
----- Message from [address] [place] [address],[facility] sent at 1/28/2021  4:17 PM EST -----  Regarding: Mirena  Pt interested in Mirena IUD

## 2021-02-09 ENCOUNTER — TELEPHONE (OUTPATIENT)
Dept: OBGYN CLINIC | Facility: CLINIC | Age: 26
End: 2021-02-09

## 2021-02-09 NOTE — TELEPHONE ENCOUNTER
Spoke to [employer] [facility] @ [facility]  Device 100%  Insertion 100%  Removal 100%  Pregnancy test 100%  Diag  Code 100%    No ded no copays  Ins   Active 1-1-20 family  No out of pocket  No precert or prior auth needed  In network Millie Damon  Not in [employer]   Ref  [id number]

## 2021-02-15 NOTE — TELEPHONE ENCOUNTER
Called patient lmom that benefits have been checked and she can be scheduled for insertion  Need to review if patient is still bleeding on her menses or when it ended  If availability and timing is appropriate patient can get IUD this menses  If not patient will be scheduled for next menses  Spoke to [employer] [facility]  Device 100%  Insertion 100%  Removal 100%  Pregnancy test 100%  Diag  Code 100%     No ded no copays  Ins   Active 1-1-20 family  No out of pocket  No precert or prior auth needed  In network [employer]  In network for [employer]  [phone]

## 2021-03-10 NOTE — PROGRESS NOTES
Iud insertions    Date/Time: 3/10/2021 3:04 PM  Performed by: Lukas Hart PA-C  Authorized by: Lukas Hart PA-C   Universal Protocol:  Consent: Verbal consent obtained  Risks and benefits: risks, benefits and alternatives were discussed  Consent given by: patient  Time out: Immediately prior to procedure a "time out" was called to verify the correct patient, procedure, equipment, support staff and site/side marked as required  Patient understanding: patient states understanding of the procedure being performed  Patient consent: the patient's understanding of the procedure matches consent given  Procedure consent: procedure consent matches procedure scheduled  Relevant documents: relevant documents present and verified  Required items: required blood products, implants, devices, and special equipment available  Patient identity confirmed: verbally with patient        Procedure:     Pelvic exam performed: yes      Negative urine pregnancy test: yes      Cervix cleaned and prepped: yes      Speculum placed in vagina: yes      Tenaculum applied to cervix: yes      Uterus sounded: yes      Uterus sound depth (cm):  8    IUD inserted with no complications: yes      IUD type:  Mirena    Strings trimmed: yes    Post-procedure:     Patient tolerated procedure well: yes      Patient will follow up after next period: no    Comments:      Pt for IUD insertion today  Pt has already been previously counseled on all risks/benefits of IUD usage/insertion  I again reviewed with pt risks of insertion including infection, perforation and expulsion  Reviewed with pt increased ectopic risk, migration, PID, infertility, high risk pregnancy if a pregnancy were to occur  I reviewed possible irregular menses and side effects of Mirena  We reviewed changes in menstrual cycles that may occur  Pt aware 5 year coverage for birth control but can have it removed at any point if she desires  Pt tolerated procedure well   Aware nothing PV x 48 hours  Will call with any cramping that does not resolve, fevers/chills  Etc

## 2021-03-15 ENCOUNTER — PROCEDURE VISIT (OUTPATIENT)
Dept: OBGYN CLINIC | Facility: CLINIC | Age: 26
End: 2021-03-15
Payer: COMMERCIAL

## 2021-03-15 VITALS — SYSTOLIC BLOOD PRESSURE: 100 MMHG | WEIGHT: 149 LBS | BODY MASS INDEX: 26.39 KG/M2 | DIASTOLIC BLOOD PRESSURE: 60 MMHG

## 2021-03-15 DIAGNOSIS — Z30.430 ENCOUNTER FOR IUD INSERTION: Primary | ICD-10-CM

## 2021-03-15 PROCEDURE — 58300 INSERT INTRAUTERINE DEVICE: CPT | Performed by: PHYSICIAN ASSISTANT

## 2021-06-16 ENCOUNTER — OFFICE VISIT (OUTPATIENT)
Dept: OBGYN CLINIC | Facility: CLINIC | Age: 26
End: 2021-06-16
Payer: COMMERCIAL

## 2021-06-16 VITALS — WEIGHT: 149 LBS | SYSTOLIC BLOOD PRESSURE: 102 MMHG | BODY MASS INDEX: 26.39 KG/M2 | DIASTOLIC BLOOD PRESSURE: 72 MMHG

## 2021-06-16 DIAGNOSIS — Z97.5 IUD (INTRAUTERINE DEVICE) IN PLACE: Primary | ICD-10-CM

## 2021-06-16 DIAGNOSIS — R10.2 PELVIC PAIN IN FEMALE: ICD-10-CM

## 2021-06-16 DIAGNOSIS — N89.8 VAGINAL ODOR: ICD-10-CM

## 2021-06-16 PROCEDURE — 99214 OFFICE O/P EST MOD 30 MIN: CPT | Performed by: NURSE PRACTITIONER

## 2021-06-16 PROCEDURE — 1036F TOBACCO NON-USER: CPT | Performed by: NURSE PRACTITIONER

## 2021-06-16 NOTE — ASSESSMENT & PLAN NOTE
Recommend starting Acidophilous daily to help with pH balance    Genital culture done per patient request   Will call with results and follow up accordingly

## 2021-06-16 NOTE — ASSESSMENT & PLAN NOTE
Rx for pelvic ultrasound to assess for IUD position due to new onset left sided pelvic pain  Will call with results and follow up accordingly

## 2021-06-16 NOTE — PROGRESS NOTES
Assessment/Plan:    IUD (intrauterine device) in place  IUD strings visualized 2cm in length at cervical os      Pelvic pain in female  Rx for pelvic ultrasound to assess for IUD position due to new onset left sided pelvic pain  Will call with results and follow up accordingly  Vaginal odor  Recommend starting Acidophilous daily to help with pH balance  Genital culture done per patient request   Will call with results and follow up accordingly       Diagnoses and all orders for this visit:    IUD (intrauterine device) in place  -     US pelvis complete w transvaginal; Future    Pelvic pain in female  -     US pelvis complete w transvaginal; Future    Vaginal odor    Other orders  -     levonorgestrel (MIRENA) 20 MCG/24HR IUD; 1 each by Intrauterine route once          Subjective:      Patient ID: Opal Garcia is a 32 y o  female  Pt presents for IUD string check  Had IUD placed March 2021 by our office  Last week she had intercourse and had some bright red bleeding afterwards  Bleeding stopped the following afternoon  She went to check for her strings and was unable to feel them  She also noted cramping that morning  Cramping has continued intermittently since then but has improved in intensity  Pain is more on lower left pelvic region  Also noted a stronger odor vaginally  Denies any itching, burning, or abnormal discharge  First month after insertion she had her menses  Since insertion she has had spotting around menses  Does have urinary frequency, but denies any urgency, hesitancy, or dysuria  Denies any bowel complaints  Denies any back pain, fevers, chills  The following portions of the patient's history were reviewed and updated as appropriate: allergies, current medications, past family history, past medical history, past social history, past surgical history and problem list     Review of Systems   Genitourinary: Positive for pelvic pain     All other systems reviewed and are negative  Objective:      /72 (BP Location: Right arm, Patient Position: Sitting, Cuff Size: Standard)   Wt 67 6 kg (149 lb)   BMI 26 39 kg/m²          Physical Exam  Vitals and nursing note reviewed  Constitutional:       Appearance: She is well-developed  HENT:      Head: Normocephalic and atraumatic  Neck:      Thyroid: No thyromegaly  Cardiovascular:      Rate and Rhythm: Normal rate and regular rhythm  Heart sounds: Normal heart sounds  Pulmonary:      Effort: Pulmonary effort is normal       Breath sounds: Normal breath sounds  Abdominal:      General: Bowel sounds are normal  There is no distension  Palpations: Abdomen is soft  There is no mass  Tenderness: There is no abdominal tenderness  There is no guarding or rebound  Genitourinary:     Labia:         Right: No rash, tenderness, lesion or injury  Left: No rash, tenderness, lesion or injury  Vagina: Normal       Cervix: Normal  No eversion  Discharge: thin clear discharge  Uterus: Normal        Adnexa: Right adnexa normal and left adnexa normal         Right: No mass, tenderness or fullness  Left: No mass, tenderness or fullness  Musculoskeletal:         General: Normal range of motion  Cervical back: Normal range of motion and neck supple  Skin:     General: Skin is warm and dry  Neurological:      Mental Status: She is alert and oriented to person, place, and time  Psychiatric:         Behavior: Behavior normal          Thought Content:  Thought content normal          Judgment: Judgment normal

## 2021-06-20 LAB
BACTERIA GENITAL AEROBE CULT: NORMAL
Lab: NORMAL

## 2021-12-03 ENCOUNTER — TELEPHONE (OUTPATIENT)
Dept: OBGYN CLINIC | Facility: CLINIC | Age: 26
End: 2021-12-03

## 2021-12-06 ENCOUNTER — OFFICE VISIT (OUTPATIENT)
Dept: URGENT CARE | Facility: CLINIC | Age: 26
End: 2021-12-06
Payer: COMMERCIAL

## 2021-12-06 VITALS
WEIGHT: 142 LBS | RESPIRATION RATE: 18 BRPM | DIASTOLIC BLOOD PRESSURE: 63 MMHG | TEMPERATURE: 98.5 F | BODY MASS INDEX: 25.16 KG/M2 | OXYGEN SATURATION: 100 % | SYSTOLIC BLOOD PRESSURE: 109 MMHG | HEIGHT: 63 IN | HEART RATE: 75 BPM

## 2021-12-06 DIAGNOSIS — R30.0 DYSURIA: Primary | ICD-10-CM

## 2021-12-06 PROCEDURE — 99213 OFFICE O/P EST LOW 20 MIN: CPT | Performed by: PHYSICIAN ASSISTANT

## 2021-12-06 RX ORDER — NITROFURANTOIN 25; 75 MG/1; MG/1
100 CAPSULE ORAL 2 TIMES DAILY
Qty: 10 CAPSULE | Refills: 0 | Status: SHIPPED | OUTPATIENT
Start: 2021-12-06 | End: 2021-12-11

## 2021-12-09 LAB
BACTERIA UR CULT: NORMAL
Lab: NO GROWTH

## 2022-05-23 ENCOUNTER — APPOINTMENT (OUTPATIENT)
Dept: RADIOLOGY | Facility: CLINIC | Age: 27
End: 2022-05-23
Payer: OTHER MISCELLANEOUS

## 2022-05-23 ENCOUNTER — OFFICE VISIT (OUTPATIENT)
Dept: URGENT CARE | Facility: CLINIC | Age: 27
End: 2022-05-23

## 2022-05-23 DIAGNOSIS — S69.91XA INJURY OF RIGHT HAND, INITIAL ENCOUNTER: ICD-10-CM

## 2022-05-23 DIAGNOSIS — S60.221A CONTUSION OF RIGHT HAND, INITIAL ENCOUNTER: Primary | ICD-10-CM

## 2022-05-23 DIAGNOSIS — S60.511A ABRASION OF HAND, RIGHT, INITIAL ENCOUNTER: ICD-10-CM

## 2022-05-23 PROCEDURE — 99203 OFFICE O/P NEW LOW 30 MIN: CPT | Performed by: PHYSICIAN ASSISTANT

## 2022-05-23 PROCEDURE — 73130 X-RAY EXAM OF HAND: CPT

## 2022-10-12 PROBLEM — Z11.3 SCREEN FOR STD (SEXUALLY TRANSMITTED DISEASE): Status: RESOLVED | Noted: 2021-01-29 | Resolved: 2022-10-12

## 2022-12-14 ENCOUNTER — OFFICE VISIT (OUTPATIENT)
Dept: URGENT CARE | Facility: CLINIC | Age: 27
End: 2022-12-14

## 2022-12-14 VITALS — HEART RATE: 79 BPM | OXYGEN SATURATION: 98 % | TEMPERATURE: 99.6 F | RESPIRATION RATE: 14 BRPM

## 2022-12-14 DIAGNOSIS — R39.9 UTI SYMPTOMS: Primary | ICD-10-CM

## 2022-12-14 LAB
SL AMB  POCT GLUCOSE, UA: ABNORMAL
SL AMB LEUKOCYTE ESTERASE,UA: ABNORMAL
SL AMB POCT BILIRUBIN,UA: ABNORMAL
SL AMB POCT BLOOD,UA: ABNORMAL
SL AMB POCT CLARITY,UA: CLEAR
SL AMB POCT COLOR,UA: YELLOW
SL AMB POCT KETONES,UA: ABNORMAL
SL AMB POCT NITRITE,UA: ABNORMAL
SL AMB POCT PH,UA: 8
SL AMB POCT SPECIFIC GRAVITY,UA: 1.02
SL AMB POCT URINE HCG: NORMAL
SL AMB POCT URINE PROTEIN: ABNORMAL
SL AMB POCT UROBILINOGEN: 0.2

## 2022-12-14 RX ORDER — CEPHALEXIN 500 MG/1
500 CAPSULE ORAL EVERY 12 HOURS SCHEDULED
Qty: 10 CAPSULE | Refills: 0 | Status: SHIPPED | OUTPATIENT
Start: 2022-12-14 | End: 2022-12-19

## 2022-12-14 RX ORDER — PHENAZOPYRIDINE HYDROCHLORIDE 200 MG/1
200 TABLET, FILM COATED ORAL
Qty: 6 TABLET | Refills: 0 | Status: SHIPPED | OUTPATIENT
Start: 2022-12-14 | End: 2022-12-16

## 2022-12-14 NOTE — PROGRESS NOTES
[facility]        NAME: Sha Gallardo is a 32 y o  female  : 1995    MRN: [id number]  DATE: 2022  TIME: 12:42 PM    Assessment and Plan   UTI symptoms [R39 9]  1  UTI symptoms  POCT urine dip    phenazopyridine (PYRIDIUM) 200 mg tablet    cephalexin (KEFLEX) 500 mg capsule    Urine culture    POCT urine HCG        UA pos for just mod blood, neg for leuks  Ddx UTI vs kidney stone, although no personal or immediate family h/o stones  Will treat with keflex but discussed strict ED precautions for imaging  Discussed strict return to care precautions as well as red flag symptoms which should prompt immediate ED referral  Pt verbalized understanding and is in agreement with plan  Please follow up with your primary care provider within the next week  Please remember that your visit today was with an urgent care provider and should not replace follow up with your primary care provider for chronic medical issues or annual physicals  Patient Instructions       Follow up with PCP in 3-5 days  Proceed to  ER if symptoms worsen  Chief Complaint     Chief Complaint   Patient presents with   • Possible UTI     Pt presents with burning sensation with urine, frequency, urgency, ongoing for 3 weeks, progressed back pain started one week ago         History of Present Illness       Pt is a(n) 32 y o  female with no reported pmh who presents out of concern for a UTI  Dysuria: Yes  Urgency: Yes  Frequency: Yes  Hematuria: Yes started this morning  Cloudy/malodorous urine: No  Vaginal discharge/itching: Yes mild itching only  Concerns for STD: No  LMP: Patient's last menstrual period was 2022  Fever: No  Pos lower back pain  Nausea/vomiting: Yes nausea only  Previous UTIs: Yes        Review of Systems   Review of Systems   Constitutional: Negative for chills, diaphoresis and fever  Respiratory: Negative for shortness of breath  Cardiovascular: Negative for chest pain  Gastrointestinal: Positive for nausea  Negative for abdominal pain and vomiting  Genitourinary: Positive for dysuria, frequency, hematuria and urgency  Negative for flank pain  Musculoskeletal: Positive for back pain  Negative for myalgias  Psychiatric/Behavioral: Negative for confusion  Current Medications       Current Outpatient Medications:   •  cephalexin (KEFLEX) 500 mg capsule, Take 1 capsule (500 mg total) by mouth every 12 (twelve) hours for 5 days, Disp: 10 capsule, Rfl: 0  •  levonorgestrel (MIRENA) 20 MCG/24HR IUD, 1 each by Intrauterine route once, Disp: , Rfl:   •  phenazopyridine (PYRIDIUM) 200 mg tablet, Take 1 tablet (200 mg total) by mouth 3 (three) times a day with meals for 2 days, Disp: 6 tablet, Rfl: 0    Current Allergies     Allergies as of 12/14/2022   • (No Known Allergies)            The following portions of the patient's history were reviewed and updated as appropriate: allergies, current medications, past family history, past medical history, past social history, past surgical history and problem list      History reviewed  No pertinent past medical history  Past Surgical History:   Procedure Laterality Date   • TYMPANOSTOMY TUBE PLACEMENT Bilateral    • WISDOM TOOTH EXTRACTION         Family History   Problem Relation Age of Onset   • No Known Problems Mother    • Hypertension Father          Medications have been verified  Objective   Pulse 79   Temp 99 6 °F (37 6 °C)   Resp 14   LMP 11/23/2022   SpO2 98%        Physical Exam     Physical Exam  Vitals and nursing note reviewed  Constitutional:       General: She is not in acute distress  Appearance: Normal appearance  She is not ill-appearing  HENT:      Head: Normocephalic and atraumatic  Cardiovascular:      Rate and Rhythm: Normal rate and regular rhythm  Heart sounds: Normal heart sounds  Pulmonary:      Effort: Pulmonary effort is normal  No respiratory distress        Breath sounds: Normal breath sounds  No wheezing, rhonchi or rales  Abdominal:      General: Abdomen is flat  Palpations: Abdomen is soft  Tenderness: There is abdominal tenderness (suprapubic)  There is no right CVA tenderness, left CVA tenderness or guarding  Musculoskeletal:         General: Tenderness (L>R lumbar tenderness) present  Skin:     General: Skin is warm and dry  Capillary Refill: Capillary refill takes less than 2 seconds  Neurological:      Mental Status: She is alert and oriented to person, place, and time     Psychiatric:         Behavior: Behavior normal

## 2022-12-15 LAB — BACTERIA UR CULT: NORMAL

## 2022-12-20 ENCOUNTER — HOSPITAL ENCOUNTER (EMERGENCY)
Facility: HOSPITAL | Age: 27
Discharge: HOME | End: 2022-12-20
Attending: EMERGENCY MEDICINE

## 2022-12-20 VITALS
DIASTOLIC BLOOD PRESSURE: 79 MMHG | WEIGHT: 155 LBS | SYSTOLIC BLOOD PRESSURE: 132 MMHG | HEIGHT: 63 IN | TEMPERATURE: 98.9 F | BODY MASS INDEX: 27.46 KG/M2 | RESPIRATION RATE: 18 BRPM | HEART RATE: 97 BPM | OXYGEN SATURATION: 96 %

## 2022-12-20 DIAGNOSIS — J11.1 INFLUENZA: Primary | ICD-10-CM

## 2022-12-20 DIAGNOSIS — M54.9 BACK PAIN: ICD-10-CM

## 2022-12-20 LAB
ANION GAP SERPL CALCULATED.3IONS-SCNC: 12 MMOL/L (ref 4–13)
BACTERIA UR QL AUTO: ABNORMAL /HPF
BASOPHILS # BLD AUTO: 0.02 THOUSANDS/ÂΜL (ref 0–0.1)
BASOPHILS NFR BLD AUTO: 0 % (ref 0–1)
BILIRUB UR QL STRIP: ABNORMAL
BUN SERPL-MCNC: 12 MG/DL (ref 5–25)
CALCIUM SERPL-MCNC: 9 MG/DL (ref 8.3–10.1)
CHLORIDE SERPL-SCNC: 102 MMOL/L (ref 96–108)
CLARITY UR: CLEAR
CO2 SERPL-SCNC: 26 MMOL/L (ref 21–32)
COLOR UR: YELLOW
CREAT SERPL-MCNC: 0.66 MG/DL (ref 0.6–1.3)
EOSINOPHIL # BLD AUTO: 0.06 THOUSAND/ÂΜL (ref 0–0.61)
EOSINOPHIL NFR BLD AUTO: 1 % (ref 0–6)
ERYTHROCYTE [DISTWIDTH] IN BLOOD BY AUTOMATED COUNT: 12.6 % (ref 11.6–15.1)
EXT PREGNANCY TEST URINE: NEGATIVE
EXT. CONTROL: NORMAL
FLUAV RNA RESP QL NAA+PROBE: POSITIVE
FLUBV RNA RESP QL NAA+PROBE: NEGATIVE
GFR SERPL CREATININE-BSD FRML MDRD: 121 ML/MIN/1.73SQ M
GLUCOSE SERPL-MCNC: 87 MG/DL (ref 65–140)
GLUCOSE UR STRIP-MCNC: NEGATIVE MG/DL
HCT VFR BLD AUTO: 41.4 % (ref 34.8–46.1)
HGB BLD-MCNC: 14.1 G/DL (ref 11.5–15.4)
HGB UR QL STRIP.AUTO: ABNORMAL
IMM GRANULOCYTES # BLD AUTO: 0.04 THOUSAND/UL (ref 0–0.2)
IMM GRANULOCYTES NFR BLD AUTO: 1 % (ref 0–2)
KETONES UR STRIP-MCNC: ABNORMAL MG/DL
LEUKOCYTE ESTERASE UR QL STRIP: NEGATIVE
LYMPHOCYTES # BLD AUTO: 0.53 THOUSANDS/ÂΜL (ref 0.6–4.47)
LYMPHOCYTES NFR BLD AUTO: 11 % (ref 14–44)
MCH RBC QN AUTO: 28.1 PG (ref 26.8–34.3)
MCHC RBC AUTO-ENTMCNC: 34.1 G/DL (ref 31.4–37.4)
MCV RBC AUTO: 83 FL (ref 82–98)
MONOCYTES # BLD AUTO: 0.51 THOUSAND/ÂΜL (ref 0.17–1.22)
MONOCYTES NFR BLD AUTO: 11 % (ref 4–12)
MUCOUS THREADS UR QL AUTO: ABNORMAL
NEUTROPHILS # BLD AUTO: 3.53 THOUSANDS/ÂΜL (ref 1.85–7.62)
NEUTS SEG NFR BLD AUTO: 76 % (ref 43–75)
NITRITE UR QL STRIP: NEGATIVE
NON-SQ EPI CELLS URNS QL MICRO: ABNORMAL /HPF
NRBC BLD AUTO-RTO: 0 /100 WBCS
PH UR STRIP.AUTO: 6 [PH]
PLATELET # BLD AUTO: 181 THOUSANDS/UL (ref 149–390)
PMV BLD AUTO: 11 FL (ref 8.9–12.7)
POTASSIUM SERPL-SCNC: 3.8 MMOL/L (ref 3.5–5.3)
PROT UR STRIP-MCNC: NEGATIVE MG/DL
RBC # BLD AUTO: 5.01 MILLION/UL (ref 3.81–5.12)
RBC #/AREA URNS AUTO: ABNORMAL /HPF
RSV RNA RESP QL NAA+PROBE: NEGATIVE
SARS-COV-2 RNA RESP QL NAA+PROBE: NEGATIVE
SODIUM SERPL-SCNC: 140 MMOL/L (ref 135–147)
SP GR UR STRIP.AUTO: 1.02 (ref 1–1.03)
UROBILINOGEN UR QL STRIP.AUTO: 0.2 E.U./DL
WBC # BLD AUTO: 4.69 THOUSAND/UL (ref 4.31–10.16)
WBC #/AREA URNS AUTO: ABNORMAL /HPF

## 2022-12-20 RX ORDER — ONDANSETRON 4 MG/1
4 TABLET, ORALLY DISINTEGRATING ORAL EVERY 6 HOURS PRN
Qty: 10 TABLET | Refills: 0 | Status: SHIPPED | OUTPATIENT
Start: 2022-12-20

## 2022-12-20 RX ORDER — CYCLOBENZAPRINE HCL 10 MG
10 TABLET ORAL 3 TIMES DAILY PRN
Qty: 9 TABLET | Refills: 0 | Status: SHIPPED | OUTPATIENT
Start: 2022-12-20 | End: 2022-12-23

## 2022-12-20 RX ORDER — KETOROLAC TROMETHAMINE 30 MG/ML
15 INJECTION, SOLUTION INTRAMUSCULAR; INTRAVENOUS ONCE
Status: COMPLETED | OUTPATIENT
Start: 2022-12-20 | End: 2022-12-20

## 2022-12-20 RX ORDER — NAPROXEN 500 MG/1
500 TABLET ORAL 2 TIMES DAILY WITH MEALS
Qty: 10 TABLET | Refills: 0 | Status: SHIPPED | OUTPATIENT
Start: 2022-12-20 | End: 2022-12-25

## 2022-12-20 RX ADMIN — KETOROLAC TROMETHAMINE 15 MG: 30 INJECTION, SOLUTION INTRAMUSCULAR at 12:32

## 2022-12-20 RX ADMIN — SODIUM CHLORIDE 1000 ML: 0.9 INJECTION, SOLUTION INTRAVENOUS at 12:26

## 2022-12-20 NOTE — Clinical Note
Ngozi Rodriguez was seen and treated in our emergency department on 12/20/2022  Diagnosis: influenza    Mia Rae  may return to work on return date  She may return on this date: 12/23/2022         If you have any questions or concerns, please don't hesitate to call        Poly Carlton PA-C    ______________________________           _______________          _______________  Hospital Representative                              Date                                Time

## 2022-12-20 NOTE — ED PROVIDER NOTES
History  Chief Complaint   Patient presents with   • Flank Pain     Left side flank pain after being on antibiotics since Thursday for possible UTI  Also started with cough and congestion      66-year-old female presenting today with multiple complaints  States that over the past week and a half she has had lower back pain that does not radiate, worsens with movement better with rest   States that she was placed on antibiotics last Thursday for possible UTI however culture does not show any growth  Has not had any changes in urination  Patient is concerned that this could be her kidneys  Patient also started with fevers, nausea, diarrhea, generalized body ache and fatigue  Also started with her menses this week and notes abdominal cramping and pain  +headache  Denies shortness of breath, chest pain  Prior to Admission Medications   Prescriptions Last Dose Informant Patient Reported? Taking? cephalexin (KEFLEX) 500 mg capsule   No No   Sig: Take 1 capsule (500 mg total) by mouth every 12 (twelve) hours for 5 days   levonorgestrel (MIRENA) 20 MCG/24HR IUD   Yes No   Si each by Intrauterine route once      Facility-Administered Medications: None       History reviewed  No pertinent past medical history  Past Surgical History:   Procedure Laterality Date   • TYMPANOSTOMY TUBE PLACEMENT Bilateral    • WISDOM TOOTH EXTRACTION         Family History   Problem Relation Age of Onset   • No Known Problems Mother    • Hypertension Father      I have reviewed and agree with the history as documented      E-Cigarette/Vaping   • E-Cigarette Use Never User      E-Cigarette/Vaping Substances   • Nicotine No    • THC No    • CBD No    • Flavoring No    • Other No    • Unknown No      Social History     Tobacco Use   • Smoking status: Never     Passive exposure: Past   • Smokeless tobacco: Never   Vaping Use   • Vaping Use: Never used   Substance Use Topics   • Alcohol use: Yes     Comment: socially   • Drug use: Yes     Types: Marijuana       Review of Systems   Constitutional: Positive for appetite change, chills, fatigue and fever  Negative for activity change, diaphoresis and unexpected weight change  HENT: Positive for congestion  Negative for dental problem, drooling, ear discharge, ear pain, facial swelling, hearing loss, postnasal drip, rhinorrhea and sore throat  Eyes: Negative  Negative for photophobia and redness  Respiratory: Positive for cough  Negative for apnea, choking, chest tightness, shortness of breath and wheezing  Cardiovascular: Negative  Gastrointestinal: Positive for diarrhea and nausea  Negative for abdominal distention, abdominal pain, anal bleeding, blood in stool, constipation, rectal pain and vomiting  Endocrine: Negative  Genitourinary: Positive for pelvic pain and vaginal bleeding  Negative for decreased urine volume, difficulty urinating, dyspareunia, dysuria, enuresis, flank pain, frequency, genital sores, hematuria, menstrual problem, urgency, vaginal discharge and vaginal pain  Musculoskeletal: Positive for back pain and myalgias  Negative for arthralgias, gait problem, joint swelling, neck pain and neck stiffness  Skin: Negative  Neurological: Positive for headaches  Negative for dizziness, tremors, seizures, syncope, facial asymmetry, speech difficulty, weakness, light-headedness and numbness  Hematological: Negative  Psychiatric/Behavioral: Negative  All other systems reviewed and are negative  Physical Exam  Physical Exam  Vitals and nursing note reviewed  Constitutional:       General: She is not in acute distress  Appearance: She is well-developed  She is not diaphoretic  HENT:      Head: Normocephalic and atraumatic  Right Ear: External ear normal       Left Ear: External ear normal       Nose: Nose normal       Mouth/Throat:      Pharynx: No oropharyngeal exudate  Eyes:      General: No scleral icterus          Right eye: No discharge  Left eye: No discharge  Conjunctiva/sclera: Conjunctivae normal       Pupils: Pupils are equal, round, and reactive to light  Cardiovascular:      Rate and Rhythm: Normal rate and regular rhythm  Heart sounds: Normal heart sounds  No murmur heard  No friction rub  No gallop  Pulmonary:      Effort: Pulmonary effort is normal  No respiratory distress  Breath sounds: Normal breath sounds  No stridor  No wheezing, rhonchi or rales  Comments: spo2 is 96% indicating adequate oxygenation   Chest:      Chest wall: No tenderness  Abdominal:      General: Bowel sounds are normal  There is no distension  Palpations: Abdomen is soft  There is no mass  Tenderness: There is no abdominal tenderness  There is no guarding or rebound  Hernia: No hernia is present  Musculoskeletal:        Arms:       Cervical back: Normal range of motion and neck supple  Lymphadenopathy:      Cervical: No cervical adenopathy  Skin:     General: Skin is warm and dry  Capillary Refill: Capillary refill takes less than 2 seconds  Coloration: Skin is not pale  Findings: No erythema or rash  Neurological:      General: No focal deficit present  Mental Status: She is alert and oriented to person, place, and time  Mental status is at baseline           Vital Signs  ED Triage Vitals [12/20/22 1047]   Temperature Pulse Respirations Blood Pressure SpO2   98 9 °F (37 2 °C) 97 18 132/79 96 %      Temp src Heart Rate Source Patient Position - Orthostatic VS BP Location FiO2 (%)   -- -- -- -- --      Pain Score       7           Vitals:    12/20/22 1047   BP: 132/79   Pulse: 97         Visual Acuity      ED Medications  Medications   sodium chloride 0 9 % bolus 1,000 mL (0 mL Intravenous Stopped 12/20/22 1325)   ketorolac (TORADOL) injection 15 mg (15 mg Intravenous Given 12/20/22 1232)       Diagnostic Studies  Results Reviewed     Procedure Component Value Units Date/Time    FLU/RSV/COVID - if FLU/RSV clinically relevant [[id number]]  (Abnormal) Collected: 12/20/22 1226    Lab Status: Final result Specimen: Nares from Nose Updated: 12/20/22 1308     SARS-CoV-2 Negative     INFLUENZA A PCR Positive     INFLUENZA B PCR Negative     RSV PCR Negative    Narrative:      FOR PEDIATRIC PATIENTS - copy/paste COVID Guidelines URL to browser: [email or web address]  [employer]    SARS-CoV-2 assay is a Nucleic Acid Amplification assay intended for the  qualitative detection of nucleic acid from SARS-CoV-2 in nasopharyngeal  swabs  Results are for the presumptive identification of SARS-CoV-2 RNA  Positive results are indicative of infection with SARS-CoV-2, the virus  causing COVID-19, but do not rule out bacterial infection or co-infection  with other viruses  Laboratories within the [place] and its  territories are required to report all positive results to the appropriate  public health authorities  Negative results do not preclude SARS-CoV-2  infection and should not be used as the sole basis for treatment or other  patient management decisions  Negative results must be combined with  clinical observations, patient history, and epidemiological information  This test has not been FDA cleared or approved  This test has been authorized by FDA under an Emergency Use Authorization  (EUA)  This test is only authorized for the duration of time the  declaration that circumstances exist justifying the authorization of the  emergency use of an in vitro diagnostic tests for detection of SARS-CoV-2  virus and/or diagnosis of COVID-19 infection under section 564(b)(1) of  the Act, 21 U  S C  789HMY-2(A)(9), unless the authorization is terminated  or revoked sooner  The test has been validated but independent review by FDA  and CLIA is pending  Test performed using [employer]:  This RT-PCR assay targets N2,  a region unique to SARS-CoV-2  A conserved region in the E-gene was chosen  for pan-Sarbecovirus detection which includes SARS-CoV-2  According to CMS-2020-01-R, this platform meets the definition of high-throughput technology      Basic metabolic panel [[id number]] Collected: 12/20/22 1226    Lab Status: Final result Specimen: Blood from Arm, Left Updated: 12/20/22 1258     Sodium 140 mmol/L      Potassium 3 8 mmol/L      Chloride 102 mmol/L      CO2 26 mmol/L      ANION GAP 12 mmol/L      BUN 12 mg/dL      Creatinine 0 66 mg/dL      Glucose 87 mg/dL      Calcium 9 0 mg/dL      eGFR 121 ml/min/1 73sq m     Narrative:      [place] guidelines for Chronic Kidney Disease (CKD):   •  Stage 1 with normal or high GFR (GFR > 90 mL/min/1 73 square meters)  •  Stage 2 Mild CKD (GFR = 60-89 mL/min/1 73 square meters)  •  Stage 3A Moderate CKD (GFR = 45-59 mL/min/1 73 square meters)  •  Stage 3B Moderate CKD (GFR = 30-44 mL/min/1 73 square meters)  •  Stage 4 Severe CKD (GFR = 15-29 mL/min/1 73 square meters)  •  Stage 5 End Stage CKD (GFR <15 mL/min/1 73 square meters)  Note: GFR calculation is accurate only with a steady state creatinine    Urine Microscopic [[id number]]  (Abnormal) Collected: 12/20/22 1228    Lab Status: Final result Specimen: Urine, Clean Catch Updated: 12/20/22 1244     RBC, UA 0-1 /hpf      WBC, UA 1-2 /hpf      Epithelial Cells Occasional /hpf      Bacteria, UA Moderate /hpf      MUCUS THREADS Moderate    CBC and differential [[id number]]  (Abnormal) Collected: 12/20/22 1226    Lab Status: Final result Specimen: Blood from Arm, Left Updated: 12/20/22 1242     WBC 4 69 Thousand/uL      RBC 5 01 Million/uL      Hemoglobin 14 1 g/dL      Hematocrit 41 4 %      MCV 83 fL      MCH 28 1 pg      MCHC 34 1 g/dL      RDW 12 6 %      MPV 11 0 fL      Platelets 193 Thousands/uL      nRBC 0 /100 WBCs      Neutrophils Relative 76 %      Immat GRANS % 1 %      Lymphocytes Relative 11 %      Monocytes Relative 11 %      Eosinophils Relative 1 %      Basophils Relative 0 %      Neutrophils Absolute 3 53 Thousands/µL      Immature Grans Absolute 0 04 Thousand/uL      Lymphocytes Absolute 0 53 Thousands/µL      Monocytes Absolute 0 51 Thousand/µL      Eosinophils Absolute 0 06 Thousand/µL      Basophils Absolute 0 02 Thousands/µL     UA w Reflex to Microscopic w Reflex to Culture [[id number]]  (Abnormal) Collected: 12/20/22 1228    Lab Status: Final result Specimen: Urine, Clean Catch Updated: 12/20/22 1236     Color, UA Yellow     Clarity, UA Clear     Specific Gravity, UA 1 025     pH, UA 6 0     Leukocytes, UA Negative     Nitrite, UA Negative     Protein, UA Negative mg/dl      Glucose, UA Negative mg/dl      Ketones, UA >=80 (3+) mg/dl      Urobilinogen, UA 0 2 E U /dl      Bilirubin, UA Small     Occult Blood, UA Large    POCT pregnancy, urine [[id number]]  (Normal) Resulted: 12/20/22 1231    Lab Status: Final result Updated: 12/20/22 1232     EXT Preg Test, Ur Negative     Control Valid                 No orders to display              Procedures  Procedures         ED Course                                             MDM  Number of Diagnoses or Management Options  Back pain  Influenza  Diagnosis management comments: Patient intermittently teary-eyed, will obtain lab work and treat symptomatically  Patient was positive for influenza  Patient is informed to return to the emergency department for worsening of symptoms and was given proper education regarding their diagnosis and symptoms  Otherwise the patient is informed to follow up with their primary care doctor for re-evaluation  The patient verbalizes understanding and agrees with above assessment and plan  All questions were answered  Please Note: Flu[employer] voice recognition software may have been used in the creation of this document  Wrong words or sound a like substitutions may have occurred due to the inherent limitations of the voice software  Amount and/or Complexity of Data Reviewed  Clinical lab tests: ordered and reviewed  Review and summarize past medical records: yes  Independent visualization of images, tracings, or specimens: yes        Disposition  Final diagnoses:   Influenza   Back pain     Time reflects when diagnosis was documented in both MDM as applicable and the Disposition within this note     Time User Action Codes Description Comment    12/20/2022  1:15 PM Inge Brighter Add [J11 1] Influenza     12/20/2022  1:16 PM Inge Brighter Add [M54 9] Back pain       ED Disposition     ED Disposition   Discharge    Condition   Stable    Date/Time   Tue Dec 20, 2022  1:14 PM    [facility] discharge to home/self care                 Follow-up Information     Follow up With Specialties Details Why Contact Info Additional [address] Emergency Department Emergency Medicine Go to  If symptoms worsen, otherwise please follow up with your family doctor [address] [place] [address] [address] [address] [phone] [address] Emergency Department, [facility], [id number]          Discharge Medication List as of 12/20/2022  1:16 PM      START taking these medications    Details   cyclobenzaprine (FLEXERIL) 10 mg tablet Take 1 tablet (10 mg total) by mouth 3 (three) times a day as needed for muscle spasms for up to 3 days, Starting Tue 12/20/2022, Until Fri 12/23/2022 at 2359, Normal      naproxen (NAPROSYN) 500 mg tablet Take 1 tablet (500 mg total) by mouth 2 (two) times a day with meals for 5 days, Starting Tue 12/20/2022, Until Sun 12/25/2022, Normal      ondansetron (Zofran ODT) 4 mg disintegrating tablet Take 1 tablet (4 mg total) by mouth every 6 (six) hours as needed for nausea or vomiting, Starting Tue 12/20/2022, Normal         CONTINUE these medications which have NOT CHANGED    Details   levonorgestrel (MIRENA) 20 MCG/24HR IUD 1 each by Intrauterine route once, Historical Med         STOP taking these medications       cephalexin (KEFLEX) 500 mg capsule Comments:   Reason for Stopping:               No discharge procedures on file      PDMP Review     None          ED Provider  Electronically Signed by           Jude Braxton PA-C  12/20/22 2465

## 2024-02-21 PROBLEM — J11.1 INFLUENZA: Status: RESOLVED | Noted: 2018-02-22 | Resolved: 2024-02-21

## 2024-12-05 ENCOUNTER — OFFICE VISIT (OUTPATIENT)
Dept: URGENT CARE | Facility: CLINIC | Age: 29
End: 2024-12-05
Payer: COMMERCIAL

## 2024-12-05 VITALS
WEIGHT: 147.8 LBS | OXYGEN SATURATION: 98 % | HEART RATE: 82 BPM | DIASTOLIC BLOOD PRESSURE: 58 MMHG | BODY MASS INDEX: 26.18 KG/M2 | TEMPERATURE: 98.8 F | SYSTOLIC BLOOD PRESSURE: 114 MMHG | RESPIRATION RATE: 12 BRPM

## 2024-12-05 DIAGNOSIS — J06.9 VIRAL URI WITH COUGH: Primary | ICD-10-CM

## 2024-12-05 PROCEDURE — 99213 OFFICE O/P EST LOW 20 MIN: CPT | Performed by: FAMILY MEDICINE

## 2024-12-05 RX ORDER — PREDNISONE 20 MG/1
20 TABLET ORAL 2 TIMES DAILY WITH MEALS
Qty: 8 TABLET | Refills: 0 | Status: SHIPPED | OUTPATIENT
Start: 2024-12-05

## 2024-12-05 NOTE — PROGRESS NOTES
"  [facility]        NAME: Alla Canales is a 29 y.o. female  : 1995    MRN: [id number]  DATE: 2024  TIME: 9:45 AM    Assessment and Plan   Viral URI with cough [J06.9]  1. Viral URI with cough  predniSONE 20 mg tablet            Patient Instructions     Steroids given for congestion. No signs of bacterial infection today. Follow up with PCP in 3-5 days if no improvement. Proceed to ER if symptoms worsen.    Chief Complaint     Chief Complaint   Patient presents with    Nasal Congestion     Reports sore throat, congestion that started about a week ago. Now having a \"really bad cough\" and migraine. Using OTC cold medication.         History of Present Illness     Alla Canales is a 29 y.o. female presenting to the office today for upper respiratory complaints. Symptoms have been present for 1 week, and include cough, congestion and sinus pressure. She has tried Excedrin for her symptoms, with some relief.  Sick contacts include: NA      Review of Systems     Review of Systems   Constitutional:  Negative for chills, fatigue and fever.   HENT:  Positive for congestion, postnasal drip, sinus pressure and sore throat. Negative for ear discharge, ear pain and sinus pain.    Eyes:  Negative for pain and discharge.   Respiratory:  Positive for cough. Negative for shortness of breath.    Cardiovascular:  Negative for chest pain and palpitations.   Gastrointestinal:  Negative for abdominal pain, diarrhea, nausea and vomiting.   Genitourinary:  Negative for difficulty urinating and dysuria.   Musculoskeletal:  Negative for arthralgias and myalgias.   Skin:  Negative for rash.   Neurological:  Positive for headaches. Negative for dizziness, syncope, light-headedness and numbness.   Psychiatric/Behavioral:  Negative for agitation.    All other systems reviewed and are negative.      Current Medications       Current Outpatient Medications:     levonorgestrel (MIRENA) 20 MCG/24HR IUD, 1 each by " Intrauterine route once, Disp: , Rfl:     predniSONE 20 mg tablet, Take 1 tablet (20 mg total) by mouth 2 (two) times a day with meals, Disp: 8 tablet, Rfl: 0    cyclobenzaprine (FLEXERIL) 10 mg tablet, Take 1 tablet (10 mg total) by mouth 3 (three) times a day as needed for muscle spasms for up to 3 days (Patient not taking: Reported on 12/5/2024), Disp: 9 tablet, Rfl: 0    naproxen (NAPROSYN) 500 mg tablet, Take 1 tablet (500 mg total) by mouth 2 (two) times a day with meals for 5 days (Patient not taking: Reported on 12/5/2024), Disp: 10 tablet, Rfl: 0    ondansetron (Zofran ODT) 4 mg disintegrating tablet, Take 1 tablet (4 mg total) by mouth every 6 (six) hours as needed for nausea or vomiting (Patient not taking: Reported on 12/5/2024), Disp: 10 tablet, Rfl: 0    Current Allergies     Allergies as of 12/05/2024    (No Known Allergies)            The following portions of the patient's history were reviewed and updated as appropriate: allergies, current medications, past family history, past medical history, past social history, past surgical history and problem list.     History reviewed. No pertinent past medical history.    Past Surgical History:   Procedure Laterality Date    TYMPANOSTOMY TUBE PLACEMENT Bilateral     WISDOM TOOTH EXTRACTION         Family History   Problem Relation Age of Onset    No Known Problems Mother     Hypertension Father        Medications have been verified.    Objective     /58   Pulse 82   Temp 98.8 °F (37.1 °C) (Tympanic)   Resp 12   Wt 67 kg (147 lb 12.8 oz)   LMP 12/03/2024 (Exact Date)   SpO2 98%   BMI 26.18 kg/m²   Patient's last menstrual period was 12/03/2024 (exact date).     Physical Exam     Physical Exam  Vitals reviewed.   Constitutional:       General: She is not in acute distress.     Appearance: Normal appearance. She is not ill-appearing.   HENT:      Head: Normocephalic and atraumatic.      Right Ear: Ear canal normal. A middle ear effusion is  present.      Left Ear: Ear canal normal. A middle ear effusion is present.      Mouth/Throat:      Mouth: Mucous membranes are moist.      Pharynx: Postnasal drip present. No oropharyngeal exudate.      Tonsils: No tonsillar exudate.   Eyes:      Extraocular Movements: Extraocular movements intact.      Conjunctiva/sclera: Conjunctivae normal.      Pupils: Pupils are equal, round, and reactive to light.   Cardiovascular:      Rate and Rhythm: Normal rate and regular rhythm.      Pulses: Normal pulses.      Heart sounds: Normal heart sounds. No murmur heard.  Pulmonary:      Effort: Pulmonary effort is normal. No respiratory distress.      Breath sounds: Normal breath sounds. No wheezing.   Musculoskeletal:      Cervical back: Normal range of motion and neck supple. No tenderness.   Skin:     General: Skin is warm.   Neurological:      General: No focal deficit present.      Mental Status: She is alert.   Psychiatric:         Mood and Affect: Mood normal.         Behavior: Behavior normal.         Judgment: Judgment normal.

## 2024-12-05 NOTE — LETTER
To whom it may concern,      Alla FERNANDEZ Ally was seen in my office on 12/05/24. She may return to work on 12/7/2024. Thank you!      Sincerely,    Rigoberto Garrett, DO

## 2025-06-10 ENCOUNTER — HOSPITAL ENCOUNTER (EMERGENCY)
Facility: HOSPITAL | Age: 30
Discharge: HOME | End: 2025-06-10
Attending: EMERGENCY MEDICINE | Admitting: EMERGENCY MEDICINE
Payer: COMMERCIAL

## 2025-06-10 ENCOUNTER — OFFICE VISIT (OUTPATIENT)
Dept: URGENT CARE | Facility: CLINIC | Age: 30
End: 2025-06-10
Payer: COMMERCIAL

## 2025-06-10 ENCOUNTER — APPOINTMENT (EMERGENCY)
Dept: RADIOLOGY | Facility: HOSPITAL | Age: 30
End: 2025-06-10
Payer: COMMERCIAL

## 2025-06-10 VITALS
BODY MASS INDEX: 26.43 KG/M2 | OXYGEN SATURATION: 100 % | TEMPERATURE: 98 F | WEIGHT: 149.2 LBS | SYSTOLIC BLOOD PRESSURE: 94 MMHG | DIASTOLIC BLOOD PRESSURE: 52 MMHG | HEART RATE: 69 BPM | RESPIRATION RATE: 18 BRPM

## 2025-06-10 VITALS
TEMPERATURE: 97.3 F | RESPIRATION RATE: 20 BRPM | DIASTOLIC BLOOD PRESSURE: 62 MMHG | BODY MASS INDEX: 26.58 KG/M2 | WEIGHT: 150 LBS | HEIGHT: 63 IN | HEART RATE: 83 BPM | SYSTOLIC BLOOD PRESSURE: 101 MMHG

## 2025-06-10 DIAGNOSIS — R11.2 NAUSEA & VOMITING: ICD-10-CM

## 2025-06-10 DIAGNOSIS — R11.2 NAUSEA AND VOMITING, UNSPECIFIED VOMITING TYPE: ICD-10-CM

## 2025-06-10 DIAGNOSIS — M54.50 ACUTE RIGHT-SIDED LOW BACK PAIN WITHOUT SCIATICA: Primary | ICD-10-CM

## 2025-06-10 DIAGNOSIS — R10.9 RIGHT FLANK PAIN: Primary | ICD-10-CM

## 2025-06-10 LAB
ALBUMIN SERPL BCG-MCNC: 4.9 G/DL (ref 3.5–5)
ALP SERPL-CCNC: 47 U/L (ref 34–104)
ALT SERPL W P-5'-P-CCNC: 9 U/L (ref 7–52)
ANION GAP SERPL CALCULATED.3IONS-SCNC: 11 MMOL/L (ref 4–13)
AST SERPL W P-5'-P-CCNC: 13 U/L (ref 13–39)
BASOPHILS # BLD AUTO: 0.08 THOUSANDS/ÂΜL (ref 0–0.1)
BASOPHILS NFR BLD AUTO: 1 % (ref 0–1)
BILIRUB SERPL-MCNC: 0.62 MG/DL (ref 0.2–1)
BILIRUB UR QL STRIP: NEGATIVE
BUN SERPL-MCNC: 8 MG/DL (ref 5–25)
CALCIUM SERPL-MCNC: 9.5 MG/DL (ref 8.4–10.2)
CHLORIDE SERPL-SCNC: 103 MMOL/L (ref 96–108)
CLARITY UR: CLEAR
CO2 SERPL-SCNC: 22 MMOL/L (ref 21–32)
COLOR UR: NORMAL
CREAT SERPL-MCNC: 0.57 MG/DL (ref 0.6–1.3)
EOSINOPHIL # BLD AUTO: 0.11 THOUSAND/ÂΜL (ref 0–0.61)
EOSINOPHIL NFR BLD AUTO: 1 % (ref 0–6)
ERYTHROCYTE [DISTWIDTH] IN BLOOD BY AUTOMATED COUNT: 12.6 % (ref 11.6–15.1)
EXT PREGNANCY TEST URINE: NEGATIVE
EXT. CONTROL: NORMAL
GFR SERPL CREATININE-BSD FRML MDRD: 124 ML/MIN/1.73SQ M
GLUCOSE SERPL-MCNC: 101 MG/DL (ref 65–140)
GLUCOSE UR STRIP-MCNC: NEGATIVE MG/DL
HCT VFR BLD AUTO: 45.2 % (ref 34.8–46.1)
HGB BLD-MCNC: 15.3 G/DL (ref 11.5–15.4)
HGB UR QL STRIP.AUTO: NEGATIVE
IMM GRANULOCYTES # BLD AUTO: 0.08 THOUSAND/UL (ref 0–0.2)
IMM GRANULOCYTES NFR BLD AUTO: 1 % (ref 0–2)
KETONES UR STRIP-MCNC: NEGATIVE MG/DL
LEUKOCYTE ESTERASE UR QL STRIP: NEGATIVE
LIPASE SERPL-CCNC: 17 U/L (ref 11–82)
LYMPHOCYTES # BLD AUTO: 1.38 THOUSANDS/ÂΜL (ref 0.6–4.47)
LYMPHOCYTES NFR BLD AUTO: 13 % (ref 14–44)
MCH RBC QN AUTO: 29 PG (ref 26.8–34.3)
MCHC RBC AUTO-ENTMCNC: 33.8 G/DL (ref 31.4–37.4)
MCV RBC AUTO: 86 FL (ref 82–98)
MONOCYTES # BLD AUTO: 0.33 THOUSAND/ÂΜL (ref 0.17–1.22)
MONOCYTES NFR BLD AUTO: 3 % (ref 4–12)
NEUTROPHILS # BLD AUTO: 8.64 THOUSANDS/ÂΜL (ref 1.85–7.62)
NEUTS SEG NFR BLD AUTO: 81 % (ref 43–75)
NITRITE UR QL STRIP: NEGATIVE
NRBC BLD AUTO-RTO: 0 /100 WBCS
PH UR STRIP.AUTO: 7 [PH]
PLATELET # BLD AUTO: 274 THOUSANDS/UL (ref 149–390)
PMV BLD AUTO: 11.4 FL (ref 8.9–12.7)
POTASSIUM SERPL-SCNC: 3.9 MMOL/L (ref 3.5–5.3)
PROT SERPL-MCNC: 8.2 G/DL (ref 6.4–8.4)
PROT UR STRIP-MCNC: NEGATIVE MG/DL
RBC # BLD AUTO: 5.28 MILLION/UL (ref 3.81–5.12)
SL AMB  POCT GLUCOSE, UA: NORMAL
SL AMB LEUKOCYTE ESTERASE,UA: NORMAL
SL AMB POCT BILIRUBIN,UA: NORMAL
SL AMB POCT BLOOD,UA: NORMAL
SL AMB POCT CLARITY,UA: CLEAR
SL AMB POCT COLOR,UA: NORMAL
SL AMB POCT KETONES,UA: NORMAL
SL AMB POCT NITRITE,UA: NORMAL
SL AMB POCT PH,UA: 7.5
SL AMB POCT SPECIFIC GRAVITY,UA: 1
SL AMB POCT URINE PROTEIN: NORMAL
SL AMB POCT UROBILINOGEN: 0.2
SODIUM SERPL-SCNC: 136 MMOL/L (ref 135–147)
SP GR UR STRIP.AUTO: 1.01 (ref 1–1.03)
UROBILINOGEN UR STRIP-ACNC: <2 MG/DL
WBC # BLD AUTO: 10.62 THOUSAND/UL (ref 4.31–10.16)

## 2025-06-10 PROCEDURE — 80053 COMPREHEN METABOLIC PANEL: CPT | Performed by: PHYSICIAN ASSISTANT

## 2025-06-10 PROCEDURE — 96374 THER/PROPH/DIAG INJ IV PUSH: CPT

## 2025-06-10 PROCEDURE — 74177 CT ABD & PELVIS W/CONTRAST: CPT

## 2025-06-10 PROCEDURE — 96361 HYDRATE IV INFUSION ADD-ON: CPT

## 2025-06-10 PROCEDURE — 83690 ASSAY OF LIPASE: CPT | Performed by: PHYSICIAN ASSISTANT

## 2025-06-10 PROCEDURE — 96375 TX/PRO/DX INJ NEW DRUG ADDON: CPT

## 2025-06-10 PROCEDURE — 99284 EMERGENCY DEPT VISIT MOD MDM: CPT

## 2025-06-10 PROCEDURE — 99213 OFFICE O/P EST LOW 20 MIN: CPT | Performed by: PHYSICIAN ASSISTANT

## 2025-06-10 PROCEDURE — 36415 COLL VENOUS BLD VENIPUNCTURE: CPT | Performed by: PHYSICIAN ASSISTANT

## 2025-06-10 PROCEDURE — 81002 URINALYSIS NONAUTO W/O SCOPE: CPT | Performed by: PHYSICIAN ASSISTANT

## 2025-06-10 PROCEDURE — 99285 EMERGENCY DEPT VISIT HI MDM: CPT | Performed by: PHYSICIAN ASSISTANT

## 2025-06-10 PROCEDURE — 81025 URINE PREGNANCY TEST: CPT | Performed by: PHYSICIAN ASSISTANT

## 2025-06-10 PROCEDURE — 85025 COMPLETE CBC W/AUTO DIFF WBC: CPT | Performed by: PHYSICIAN ASSISTANT

## 2025-06-10 PROCEDURE — 81003 URINALYSIS AUTO W/O SCOPE: CPT | Performed by: PHYSICIAN ASSISTANT

## 2025-06-10 RX ORDER — ONDANSETRON 4 MG/1
4 TABLET, FILM COATED ORAL EVERY 6 HOURS
Qty: 12 TABLET | Refills: 0 | Status: SHIPPED | OUTPATIENT
Start: 2025-06-10

## 2025-06-10 RX ORDER — KETOROLAC TROMETHAMINE 30 MG/ML
30 INJECTION, SOLUTION INTRAMUSCULAR; INTRAVENOUS ONCE
Status: COMPLETED | OUTPATIENT
Start: 2025-06-10 | End: 2025-06-10

## 2025-06-10 RX ORDER — ONDANSETRON 2 MG/ML
4 INJECTION INTRAMUSCULAR; INTRAVENOUS ONCE
Status: COMPLETED | OUTPATIENT
Start: 2025-06-10 | End: 2025-06-10

## 2025-06-10 RX ADMIN — IOHEXOL 100 ML: 350 INJECTION, SOLUTION INTRAVENOUS at 10:44

## 2025-06-10 RX ADMIN — KETOROLAC TROMETHAMINE 30 MG: 30 INJECTION, SOLUTION INTRAMUSCULAR; INTRAVENOUS at 10:06

## 2025-06-10 RX ADMIN — SODIUM CHLORIDE 1000 ML: 0.9 INJECTION, SOLUTION INTRAVENOUS at 09:59

## 2025-06-10 RX ADMIN — ONDANSETRON 4 MG: 2 INJECTION INTRAMUSCULAR; INTRAVENOUS at 10:06

## 2025-06-10 NOTE — DISCHARGE INSTRUCTIONS
Follow up with your primary care provider next 2 days for recheck if any persistent concerns    Zofran for nausea/vomiting    May take tylenol or ibuprofen (with food) for pain    Return to ED for fever, worsening pain or symptoms    regular rate and rhythm

## 2025-06-10 NOTE — ED PROVIDER NOTES
Time reflects when diagnosis was documented in both MDM as applicable and the Disposition within this note       Time User Action Codes Description Comment    6/10/2025 12:12 PM Rafal Ahmadi Add [R10.9] Right flank pain     6/10/2025 12:12 PM Rafal Ahmadi Add [R11.2] Nausea & vomiting           ED Disposition       ED Disposition   Discharge    Condition   Stable    Date/Time   Tue Gera 10, 2025 12:12 PM    Comment   Alla Canales discharge to home/self care.                   Assessment & Plan       Medical Decision Making  Differential dx includes kidney stone, pyelonephritis, pancreatitis, biliary pathology, other intraabdominal pathology    Labs reviewed  U/a: unremarkable.   CT abd/pelvis: unremarkable  Unclear etiology of patient's symptoms at this time  She is well appearing and non toxic with benign abdominal exam  Rx zofran  Advised follow up with PCP next 2 days for recheck  Return precautions reviewed, including fever, worsening pain     Amount and/or Complexity of Data Reviewed  Labs: ordered.  Radiology: ordered.    Risk  Prescription drug management.             Medications   sodium chloride 0.9 % bolus 1,000 mL (0 mL Intravenous Stopped 6/10/25 1059)   ketorolac (TORADOL) injection 30 mg (30 mg Intravenous Given 6/10/25 1006)   ondansetron (ZOFRAN) injection 4 mg (4 mg Intravenous Given 6/10/25 1006)   iohexol (OMNIPAQUE) 350 MG/ML injection (MULTI-DOSE) 100 mL (100 mL Intravenous Given 6/10/25 1044)       ED Risk Strat Scores                    No data recorded                            History of Present Illness       Chief Complaint   Patient presents with    Flank Pain     C/o n/v x 1 week. The last couple days noticed right flank/ab pain. Hx of uti/kidney infection feels similar        Past Medical History[1]   Past Surgical History[2]   Family History[3]   Social History[4]   E-Cigarette/Vaping    E-Cigarette Use Never User       E-Cigarette/Vaping Substances    Nicotine No     THC No      CBD No     Flavoring No     Other No     Unknown No       I have reviewed and agree with the history as documented.     Patient is a 29 yo wf with history of pyelonephritis, IUD who reports 1 week history of nausea and vomiting (mostly in mornings) and R flank pain to R back pain beginning 2 days ago. No fever. States she did wake middle of last night with sweats. No shaking chills. No urinary symptoms. No diarrhea or constipation. States her stools have been softer than usual past couple weeks. LMP: 5/22/25.         Review of Systems   Constitutional:  Negative for chills and fever.   Respiratory:  Negative for shortness of breath.    Cardiovascular:  Negative for chest pain.   Gastrointestinal:  Positive for abdominal pain, nausea and vomiting. Negative for blood in stool.   Genitourinary:  Positive for flank pain. Negative for dysuria and hematuria.           Objective       ED Triage Vitals [06/10/25 0945]   Temperature Pulse Blood Pressure Respirations SpO2 Patient Position - Orthostatic VS   98 °F (36.7 °C) 83 121/56 18 100 % Sitting      Temp Source Heart Rate Source BP Location FiO2 (%) Pain Score    Oral Monitor Left arm -- 6      Vitals      Date and Time Temp Pulse SpO2 Resp BP Pain Score FACES Pain Rating User   06/10/25 1147 -- 69 100 % 18 94/52 4 -- CS   06/10/25 1109 -- 75 100 % -- 107/54 -- -- KD   06/10/25 1009 -- 58 100 % -- 114/59 -- -- KD   06/10/25 1006 -- -- -- -- -- 6 -- CHRIS   06/10/25 0945 98 °F (36.7 °C) 83 100 % 18 121/56 6 -- MK            Physical Exam  Vitals and nursing note reviewed.   Constitutional:       General: She is not in acute distress.     Appearance: Normal appearance. She is not ill-appearing, toxic-appearing or diaphoretic.   HENT:      Head: Normocephalic and atraumatic.      Mouth/Throat:      Mouth: Mucous membranes are moist.      Pharynx: Oropharynx is clear.     Eyes:      Extraocular Movements: Extraocular movements intact.      Conjunctiva/sclera: Conjunctivae  normal.      Pupils: Pupils are equal, round, and reactive to light.       Cardiovascular:      Rate and Rhythm: Normal rate and regular rhythm.      Pulses: Normal pulses.      Heart sounds: Normal heart sounds.   Pulmonary:      Effort: Pulmonary effort is normal.      Breath sounds: Normal breath sounds.   Abdominal:      General: There is no distension.      Palpations: There is no mass.      Tenderness: There is no abdominal tenderness. There is no right CVA tenderness, left CVA tenderness, guarding or rebound.      Hernia: No hernia is present.     Musculoskeletal:         General: Normal range of motion.      Cervical back: Normal range of motion and neck supple.     Skin:     General: Skin is warm and dry.      Capillary Refill: Capillary refill takes less than 2 seconds.     Neurological:      General: No focal deficit present.      Mental Status: She is alert and oriented to person, place, and time. Mental status is at baseline.         Results Reviewed       Procedure Component Value Units Date/Time    Comprehensive metabolic panel [[id number]]  (Abnormal) Collected: 06/10/25 1003    Lab Status: Final result Specimen: Blood from Arm, Left Updated: 06/10/25 1036     Sodium 136 mmol/L      Potassium 3.9 mmol/L      Chloride 103 mmol/L      CO2 22 mmol/L      ANION GAP 11 mmol/L      BUN 8 mg/dL      Creatinine 0.57 mg/dL      Glucose 101 mg/dL      Calcium 9.5 mg/dL      AST 13 U/L      ALT 9 U/L      Alkaline Phosphatase 47 U/L      Total Protein 8.2 g/dL      Albumin 4.9 g/dL      Total Bilirubin 0.62 mg/dL      eGFR 124 ml/min/1.73sq m     Narrative:      National Kid[employer] guidelines for Chronic Kidney Disease (CKD):     Stage 1 with normal or high GFR (GFR > 90 mL/min/1.73 square meters)    Stage 2 Mild CKD (GFR = 60-89 mL/min/1.73 square meters)    Stage 3A Moderate CKD (GFR = 45-59 mL/min/1.73 square meters)    Stage 3B Moderate CKD (GFR = 30-44 mL/min/1.73 square meters)    Stage 4  Severe CKD (GFR = 15-29 mL/min/1.73 square meters)    Stage 5 End Stage CKD (GFR <15 mL/min/1.73 square meters)  Note: GFR calculation is accurate only with a steady state creatinine    Lipase [[id number]]  (Normal) Collected: 06/10/25 1003    Lab Status: Final result Specimen: Blood from Arm, Left Updated: 06/10/25 1036     Lipase 17 u/L     UA w Reflex to Microscopic w Reflex to Culture [[id number]] Collected: 06/10/25 1003    Lab Status: Final result Specimen: Urine, Clean Catch Updated: 06/10/25 1020     Color, UA Light Yellow     Clarity, UA Clear     Specific Gravity, UA 1.015     pH, UA 7.0     Leukocytes, UA Negative     Nitrite, UA Negative     Protein, UA Negative mg/dl      Glucose, UA Negative mg/dl      Ketones, UA Negative mg/dl      Urobilinogen, UA <2.0 mg/dl      Bilirubin, UA Negative     Occult Blood, UA Negative    CBC and differential [[id number]]  (Abnormal) Collected: 06/10/25 1003    Lab Status: Final result Specimen: Blood from Arm, Left Updated: 06/10/25 1016     WBC 10.62 Thousand/uL      RBC 5.28 Million/uL      Hemoglobin 15.3 g/dL      Hematocrit 45.2 %      MCV 86 fL      MCH 29.0 pg      MCHC 33.8 g/dL      RDW 12.6 %      MPV 11.4 fL      Platelets 274 Thousands/uL      nRBC 0 /100 WBCs      Segmented % 81 %      Immature Grans % 1 %      Lymphocytes % 13 %      Monocytes % 3 %      Eosinophils Relative 1 %      Basophils Relative 1 %      Absolute Neutrophils 8.64 Thousands/µL      Absolute Immature Grans 0.08 Thousand/uL      Absolute Lymphocytes 1.38 Thousands/µL      Absolute Monocytes 0.33 Thousand/µL      Eosinophils Absolute 0.11 Thousand/µL      Basophils Absolute 0.08 Thousands/µL     POCT pregnancy, urine [[id number]]  (Normal) Collected: 06/10/25 1003    Lab Status: Final result Updated: 06/10/25 1003     EXT Preg Test, Ur Negative     Control Valid            CT abdomen pelvis with contrast   Final Interpretation by Conrad Trevino MD (06/10 1200)      No acute inflammatory  process identified         Workstation performed: [id number]             Procedures    ED Medication and Procedure Management   Prior to Admission Medications   Prescriptions Last Dose Informant Patient Reported? Taking?   cyclobenzaprine (FLEXERIL) 10 mg tablet   No No   Sig: Take 1 tablet (10 mg total) by mouth 3 (three) times a day as needed for muscle spasms for up to 3 days   Patient not taking: Reported on 2024   levonorgestrel (MIRENA) 20 MCG/24HR IUD  Self Yes No   Si each by Intrauterine route once   naproxen (NAPROSYN) 500 mg tablet   No No   Sig: Take 1 tablet (500 mg total) by mouth 2 (two) times a day with meals for 5 days   Patient not taking: Reported on 2024   ondansetron (Zofran ODT) 4 mg disintegrating tablet   No No   Sig: Take 1 tablet (4 mg total) by mouth every 6 (six) hours as needed for nausea or vomiting   Patient not taking: Reported on 2024   predniSONE 20 mg tablet   No No   Sig: Take 1 tablet (20 mg total) by mouth 2 (two) times a day with meals   Patient not taking: Reported on 6/10/2025      Facility-Administered Medications: None     Patient's Medications   Discharge Prescriptions    ONDANSETRON (ZOFRAN) 4 MG TABLET    Take 1 tablet (4 mg total) by mouth every 6 (six) hours       Start Date: 6/10/2025 End Date: --       Order Dose: 4 mg       Quantity: 12 tablet    Refills: 0     No discharge procedures on file.  ED SEPSIS DOCUMENTATION   Time reflects when diagnosis was documented in both MDM as applicable and the Disposition within this note       Time User Action Codes Description Comment    6/10/2025 12:12 PM Rafal Ahmadi Add [R10.9] Right flank pain     6/10/2025 12:12 PM Rafal Ahmadi Add [R11.2] Nausea & vomiting                      [1] No past medical history on file.  [2]   Past Surgical History:  Procedure Laterality Date    TYMPANOSTOMY TUBE PLACEMENT Bilateral     WISDOM TOOTH EXTRACTION     [3]   Family History  Problem Relation Name Age of  Onset    No Known Problems Mother      Hypertension Father     [4]   Social History  Tobacco Use    Smoking status: Never     Passive exposure: Past    Smokeless tobacco: Never   Vaping Use    Vaping status: Never Used   Substance Use Topics    Alcohol use: Yes     Comment: socially    Drug use: Yes     Types: Marijuana        Rafal Ahmadi PA-C  06/10/25 1228

## 2025-06-10 NOTE — Clinical Note
Alla Canales was seen and treated in our emergency department on 6/10/2025.                Diagnosis:     Alla  .    She may return on this date:     Alla Canales is excused from work Tuesday June 10th     If you have any questions or concerns, please don't hesitate to call.      Rafal Ahmadi PA-C    ______________________________           _______________          _______________  Hospital Representative                              Date                                Time

## 2025-06-10 NOTE — PROGRESS NOTES
[facility]        NAME: Alla Canales is a 30 y.o. female  : 1995    MRN: [id number]  DATE: Glendy 10, 2025  TIME: 9:30 AM    Assessment and Plan   Acute right-sided low back pain without sciatica [M54.50]  1. Acute right-sided low back pain without sciatica  POCT urine dip    Urine culture      2. Nausea and vomiting, unspecified vomiting type  Transfer to other facility            Urine dip with only trace leuks.  Concern for right sided kidney stone versus pyelonephritis.  Since the patient has had worsening symptoms over 1 week with 2 episodes of emesis already this morning, subjective fevers last night, fatigue and myalgias I am recommending she be evaluated in the emergency room.    Patient Instructions   There are no Patient Instructions on file for this visit.      Follow up with PCP in 3-5 days.  Proceed to  ER if symptoms worsen.    Chief Complaint     Chief Complaint   Patient presents with    Possible UTI     Possible UTI has been having rt flank pain, fever, vomiting this AM has had frequency 4/5 days. Nausea x 1 week         History of Present Illness       The patient is a 30-year-old female presenting today for a possible UTI x 1 week.  For the last week she has been waking up with nausea and having had sporadic episodes of emesis.  2 episodes of possibly bloody emesis this morning.  She reports right-sided low back pain that wraps around to the front.  Has had pyelonephritis before and this feels similar.  Has had urinary frequency and hesitancy.  Also reports fatigue and myalgias.              Review of Systems   Review of Systems   Constitutional:  Positive for fatigue. Negative for activity change, appetite change, chills and fever.   HENT:  Negative for congestion, ear pain, rhinorrhea, sinus pressure, sinus pain and sore throat.    Eyes:  Negative for pain and visual disturbance.   Respiratory:  Negative for cough, chest tightness and shortness of breath.    Cardiovascular:   "Negative for chest pain and palpitations.   Gastrointestinal:  Positive for abdominal pain, nausea and vomiting. Negative for diarrhea.   Genitourinary:  Positive for frequency. Negative for dysuria and hematuria.        Hesitancy   Musculoskeletal:  Positive for back pain and myalgias. Negative for arthralgias.   Skin:  Negative for color change, pallor and rash.   Neurological:  Negative for seizures, syncope and headaches.   All other systems reviewed and are negative.        Current Medications     Current Medications[1]    Current Allergies     Allergies as of 06/10/2025    (No Known Allergies)            The following portions of the patient's history were reviewed and updated as appropriate: allergies, current medications, past family history, past medical history, past social history, past surgical history and problem list.     Past Medical History[2]    Past Surgical History[3]    Family History[4]      Medications have been verified.        Objective   /62   Pulse 83   Temp (!) 97.3 °F (36.3 °C)   Resp 20   Ht 5' 3\" (1.6 m)   Wt 68 kg (150 lb)   LMP 05/22/2025   BMI 26.57 kg/m²        Physical Exam     Physical Exam  Constitutional:       General: She is not in acute distress.     Appearance: Normal appearance. She is normal weight. She is not ill-appearing, toxic-appearing or diaphoretic.   HENT:      Head: Normocephalic and atraumatic.     Cardiovascular:      Rate and Rhythm: Normal rate and regular rhythm.      Heart sounds: Normal heart sounds. No murmur heard.     No friction rub. No gallop.   Pulmonary:      Effort: Pulmonary effort is normal. No respiratory distress.      Breath sounds: Normal breath sounds. No stridor. No wheezing, rhonchi or rales.   Chest:      Chest wall: No tenderness.   Abdominal:      General: Abdomen is flat. There is no distension.      Palpations: Abdomen is soft. There is no mass.      Tenderness: There is abdominal tenderness. There is no right CVA " tenderness, left CVA tenderness, guarding or rebound.      Hernia: No hernia is present.      Comments: Generalized abdominal tenderness.     Skin:     General: Skin is warm and dry.      Capillary Refill: Capillary refill takes less than 2 seconds.     Neurological:      Mental Status: She is alert.                      [1]   Current Outpatient Medications:     levonorgestrel (MIRENA) 20 MCG/24HR IUD, 1 each by Intrauterine route once, Disp: , Rfl:     cyclobenzaprine (FLEXERIL) 10 mg tablet, Take 1 tablet (10 mg total) by mouth 3 (three) times a day as needed for muscle spasms for up to 3 days (Patient not taking: Reported on 12/5/2024), Disp: 9 tablet, Rfl: 0    naproxen (NAPROSYN) 500 mg tablet, Take 1 tablet (500 mg total) by mouth 2 (two) times a day with meals for 5 days (Patient not taking: Reported on 12/5/2024), Disp: 10 tablet, Rfl: 0    ondansetron (Zofran ODT) 4 mg disintegrating tablet, Take 1 tablet (4 mg total) by mouth every 6 (six) hours as needed for nausea or vomiting (Patient not taking: Reported on 12/5/2024), Disp: 10 tablet, Rfl: 0    predniSONE 20 mg tablet, Take 1 tablet (20 mg total) by mouth 2 (two) times a day with meals (Patient not taking: Reported on 6/10/2025), Disp: 8 tablet, Rfl: 0  [2] No past medical history on file.  [3]   Past Surgical History:  Procedure Laterality Date    TYMPANOSTOMY TUBE PLACEMENT Bilateral     WISDOM TOOTH EXTRACTION     [4]   Family History  Problem Relation Name Age of Onset    No Known Problems Mother      Hypertension Father